# Patient Record
Sex: MALE | Race: WHITE | NOT HISPANIC OR LATINO | Employment: FULL TIME | URBAN - METROPOLITAN AREA
[De-identification: names, ages, dates, MRNs, and addresses within clinical notes are randomized per-mention and may not be internally consistent; named-entity substitution may affect disease eponyms.]

---

## 2017-02-24 ENCOUNTER — HOSPITAL ENCOUNTER (EMERGENCY)
Facility: CLINIC | Age: 37
Discharge: HOME | End: 2017-02-24
Attending: NURSE PRACTITIONER | Admitting: NURSE PRACTITIONER
Payer: COMMERCIAL

## 2017-02-24 VITALS
OXYGEN SATURATION: 97 % | DIASTOLIC BLOOD PRESSURE: 78 MMHG | SYSTOLIC BLOOD PRESSURE: 154 MMHG | RESPIRATION RATE: 14 BRPM | TEMPERATURE: 98 F

## 2017-02-24 DIAGNOSIS — S80.811A LEG ABRASION, RIGHT, INITIAL ENCOUNTER: ICD-10-CM

## 2017-02-24 DIAGNOSIS — L03.115 CELLULITIS OF RIGHT LOWER EXTREMITY: ICD-10-CM

## 2017-02-24 PROCEDURE — 99213 OFFICE O/P EST LOW 20 MIN: CPT | Performed by: NURSE PRACTITIONER

## 2017-02-24 PROCEDURE — 99213 OFFICE O/P EST LOW 20 MIN: CPT

## 2017-02-24 RX ORDER — CEPHALEXIN 500 MG/1
500 CAPSULE ORAL 4 TIMES DAILY
Qty: 28 CAPSULE | Refills: 0 | Status: SHIPPED | OUTPATIENT
Start: 2017-02-24 | End: 2017-03-03

## 2017-02-24 NOTE — DISCHARGE INSTRUCTIONS
Change dressing twice daily.  Ok to shower. Return for fever, increased redness, swelling, pain, or drainage from the wound.  Discharge Instructions for Cellulitis  You have been diagnosed with cellulitis. This is an infection in the deepest layer of the skin. In some cases, the infection also affects the muscle. Cellulitis is caused by bacteria. The bacteria can enter the body through broken skin. This can happen with a cut, scratch, animal bite, or an insect bite that has been scratched. You may have been treated in the hospital with antibiotics and fluids. You will likely be given a prescription for antibiotics to take at home. This sheet will help you take care of yourself at home.  Home Care  When you are home:    Take the prescribed antibiotic medication you are given as directed until it is gone. Take it even if you feel better. It treats the infection and stops it from returning. Not taking all of the medication can make future infections hard to treat.    Keep the infected area clean.    When possible, raise the infected area above the level of your heart. This helps keep swelling down.    Talk to your doctor if you are in pain. Ask what kind of over-the-counter medication you can take for pain.    Apply clean bandages as advised.    Take your temperature once a day for a week.    Wash your hands often to prevent spreading the infection.  In the future, wash your hands before and after you touch cuts, scratches, or bandages. This will help prevent infection.   When to Call Your Doctor  Call your doctor immediately if you have any of the following:    Vomiting    Fever of100.4 F (38 C) or higher, or as directed by your health care provider    Shaking chills    Redness that gets worse in or around the infected area    Swelling of the infected area    Pain that gets worse in or around the infected area    Difficulty or pain when moving the joints above or below the infected area    Discharge or pus draining  from the area     5189-2707 [employer]. [address] [place] [address], [place] [address]. All rights reserved. This information is not intended as a substitute for professional medical care. Always follow your healthcare professional's instructions.

## 2017-02-24 NOTE — ED PROVIDER NOTES
History     Chief Complaint   Patient presents with     Leg Injury     scraped right lower leg     HPI  Eduardo Casanova is a 36 year old male who presents to urgent care for evaluation of right lower leg/shin abrasion.  This occurred 3 days ago.  He tripped over a mower deck catching his right shin on the blade.  He applied a dressing and has been unable to remove the dressing since this occurred.  He noticed increased swelling and increased pain from the distal aspect of the abrasion.  Denies fever.     I have reviewed the Medications, Allergies, Past Medical and Surgical History, and Social History in the Epic system.    Review of Systems  As mentioned above in the history present illness. All other systems were reviewed and are negative.    Physical Exam   BP: 154/78  Heart Rate: 100  Temp: 98  F (36.7  C)  Resp: 14  SpO2: 97 %  Physical Exam  Appearance: NAD.  Skin: >10cm abrasion along the middle aspect of his right shin.  The distal aspect is indurated with surrounding faint erythema.  ED Course     ED Course     Procedures           Labs Ordered and Resulted from Time of ED Arrival Up to the Time of Departure from the ED - No data to display    Assessments & Plan (with Medical Decision Making)     I have reviewed the nursing notes.    I have reviewed the findings, diagnosis, plan and need for follow up with the patient.    Discharge Medication List as of 2/24/2017  1:44 PM      START taking these medications    Details   cephALEXin (KEFLEX) 500 MG capsule Take 1 capsule (500 mg) by mouth 4 times daily for 7 days, Disp-28 capsule, R-0, E-Prescribe             Final diagnoses:   Leg abrasion, right, initial encounter   Cellulitis of right lower extremity   -instructed to return for worsening symptoms.      2/24/2017   [facility] EMERGENCY DEPARTMENT     Mita Powell APRN CNP  02/24/17 [id number]

## 2017-02-24 NOTE — ED NOTES
Patient tripped over a machine in his garage three days ago resulting in a shin laceration. He put a dressing on it and he is having trouble getting the dressing off. Last evening his right ankle became swollen.

## 2017-02-24 NOTE — ED AVS SNAPSHOT
[facility] Emergency Department    [address]    [facility] [address]    Phone:  [phone]    Fax:  [phone]                                       Eduardo Casanova   MRN: [id number]    Department:  [facility] Emergency Department   Date of Visit:  2/24/2017           Patient Information     Date Of Birth          1980        Your diagnoses for this visit were:     Leg abrasion, right, initial encounter     Cellulitis of right lower extremity        You were seen by Mita Powell APRN CNP.      Follow-up Information     Follow up with [facility].    Why:  As needed    Contact information:    [phone]          Discharge Instructions       Change dressing twice daily.  Ok to shower. Return for fever, increased redness, swelling, pain, or drainage from the wound.  Discharge Instructions for Cellulitis  You have been diagnosed with cellulitis. This is an infection in the deepest layer of the skin. In some cases, the infection also affects the muscle. Cellulitis is caused by bacteria. The bacteria can enter the body through broken skin. This can happen with a cut, scratch, animal bite, or an insect bite that has been scratched. You may have been treated in the hospital with antibiotics and fluids. You will likely be given a prescription for antibiotics to take at home. This sheet will help you take care of yourself at home.  Home Care  When you are home:    Take the prescribed antibiotic medication you are given as directed until it is gone. Take it even if you feel better. It treats the infection and stops it from returning. Not taking all of the medication can make future infections hard to treat.    Keep the infected area clean.    When possible, raise the infected area above the level of your heart. This helps keep swelling down.    Talk to your doctor if you are in pain. Ask what kind of over-the-counter medication you can take for pain.    Apply clean bandages as  advised.    Take your temperature once a day for a week.    Wash your hands often to prevent spreading the infection.  In the future, wash your hands before and after you touch cuts, scratches, or bandages. This will help prevent infection.   When to Call Your Doctor  Call your doctor immediately if you have any of the following:    Vomiting    Fever of100.4 F (38 C) or higher, or as directed by your health care provider    Shaking chills    Redness that gets worse in or around the infected area    Swelling of the infected area    Pain that gets worse in or around the infected area    Difficulty or pain when moving the joints above or below the infected area    Discharge or pus draining from the area     2084-5103 [employer]. [address] [place] [address]. All rights reserved. This information is not intended as a substitute for professional medical care. Always follow your healthcare professional's instructions.          Future Appointments        Provider Department Dept Phone Center    4/17/2017 10:00 AM Jimy Esparza MD [facility] [phone] [place]      24 Hour Appointment Hotline       To make an appointment at any [facility], call [phone] ([phone]). If you don't have a family doctor or clinic, we will help you find one. [facility] are conveniently located to serve the needs of you and your family.             Review of your medicines      START taking        Dose / Directions Last dose taken    cephALEXin 500 MG capsule   Commonly known as:  KEFLEX   Dose:  500 mg   Quantity:  28 capsule        Take 1 capsule (500 mg) by mouth 4 times daily for 7 days   Refills:  0                Prescriptions were sent or printed at these locations (1 Prescription)                   [facility] [address] [place], [place] [address] [facility] AT [address]   [address] [facility] [address]    Telephone:  [phone]   Fax:   "[phone]   Hours:                  E-Prescribed (1 of 1)         cephALEXin (KEFLEX) 500 MG capsule                Orders Needing Specimen Collection     None      Pending Results     No orders found from 2017 to 2017.            Pending Culture Results     No orders found from 2017 to 2017.             Test Results from your hospital stay            Thank you for choosing [facility]       Thank you for choosing [facility] for your care. Our goal is always to provide you with excellent care. Hearing back from our patients is one way we can continue to improve our services. Please take a few minutes to complete the written survey that you may receive in the mail after you visit with us. Thank you!        Consumer Agent Portal (CAP)hart Information     [employer] lets you send messages to your doctor, view your test results, renew your prescriptions, schedule appointments and more. To sign up, go to [email or web address] . Click on \"Log in\" on the left side of the screen, which will take you to the Welcome page. Then click on \"Sign up Now\" on the right side of the page.     You will be asked to enter the access code listed below, as well as some personal information. Please follow the directions to create your username and password.     Your access code is: [email or web address]  Expires: 2017  1:44 PM     Your access code will  in 90 days. If you need help or a new code, please call your [facility] clinic or [phone].        Care EveryWhere ID     This is your Care EveryWhere ID. This could be used by other organizations to access your [facility] medical records  [id number]        After Visit Summary       This is your record. Keep this with you and show to your community pharmacist(s) and doctor(s) at your next visit.                  "

## 2017-02-24 NOTE — ED AVS SNAPSHOT
[facility] Emergency Department    [address] [facility] [address]    Phone:  [phone]    Fax:  [phone]                                       Eduardo Casanova   MRN: [id number]    Department:  [facility] Emergency Department   Date of Visit:  2/24/2017           After Visit Summary Signature Page     I have received my discharge instructions, and my questions have been answered. I have discussed any challenges I see with this plan with the nurse or doctor.    ..........................................................................................................................................  Patient/Patient Representative Signature      ..........................................................................................................................................  Patient Representative Print Name and Relationship to Patient    ..................................................               ................................................  Date                                            Time    ..........................................................................................................................................  Reviewed by Signature/Title    ...................................................              ..............................................  Date                                                            Time

## 2017-06-29 ENCOUNTER — OFFICE VISIT (OUTPATIENT)
Dept: FAMILY MEDICINE | Facility: CLINIC | Age: 37
End: 2017-06-29
Payer: COMMERCIAL

## 2017-06-29 VITALS — TEMPERATURE: 97.3 F | DIASTOLIC BLOOD PRESSURE: 73 MMHG | SYSTOLIC BLOOD PRESSURE: 119 MMHG

## 2017-06-29 DIAGNOSIS — Z23 NEED FOR VACCINATION: ICD-10-CM

## 2017-06-29 DIAGNOSIS — Z71.84 TRAVEL ADVICE ENCOUNTER: Primary | ICD-10-CM

## 2017-06-29 PROCEDURE — 90472 IMMUNIZATION ADMIN EACH ADD: CPT | Mod: GA | Performed by: NURSE PRACTITIONER

## 2017-06-29 PROCEDURE — 90632 HEPA VACCINE ADULT IM: CPT | Mod: GA | Performed by: NURSE PRACTITIONER

## 2017-06-29 PROCEDURE — 99402 PREV MED CNSL INDIV APPRX 30: CPT | Mod: 25 | Performed by: NURSE PRACTITIONER

## 2017-06-29 PROCEDURE — 90715 TDAP VACCINE 7 YRS/> IM: CPT | Mod: GA | Performed by: NURSE PRACTITIONER

## 2017-06-29 PROCEDURE — 90471 IMMUNIZATION ADMIN: CPT | Mod: GA | Performed by: NURSE PRACTITIONER

## 2017-06-29 RX ORDER — AZITHROMYCIN 500 MG/1
500 TABLET, FILM COATED ORAL DAILY
Qty: 3 TABLET | Refills: 0 | Status: SHIPPED | OUTPATIENT
Start: 2017-06-29 | End: 2017-11-10

## 2017-06-29 RX ORDER — ATOVAQUONE AND PROGUANIL HYDROCHLORIDE 250; 100 MG/1; MG/1
1 TABLET, FILM COATED ORAL DAILY
Qty: 20 TABLET | Refills: 0 | Status: SHIPPED | OUTPATIENT
Start: 2017-06-29 | End: 2018-05-14

## 2017-06-29 NOTE — MR AVS SNAPSHOT
After Visit Summary   6/29/2017    Eduardo Casanova    MRN: [id number]           Patient Information     Date Of Birth          1980        Visit Information        Provider Department      6/29/2017 12:45 PM Berta Ang APRN CNP [facility]        Today's Diagnoses     Travel advice encounter    -  1    Need for vaccination          Care Instructions    Today June 29, 2017 you received the    Hepatitis A Vaccine - Please return on 12/26/17 or later for your 2nd and final dose.    Tetanus (Tdap) Vaccine  .    These appointments can be made as a NURSE ONLY visit.    **It is very important for the vaccinations to be given on the scheduled day(s), this helps ensure you receive the full effectiveness of the vaccine.**    Please call [facility] with any questions [phone]    Thank you for visiting [facility]              Follow-ups after your visit        Future tests that were ordered for you today     Open Future Orders        Priority Expected Expires Ordered    HEPA VACCINE ADULT IM Routine  6/29/2018 6/29/2017            Who to contact     If you have questions or need follow up information about today's clinic visit or your schedule please contact [facility] directly at [phone].  Normal or non-critical lab and imaging results will be communicated to you by MyChart, letter or phone within 4 business days after the clinic has received the results. If you do not hear from us within 7 days, please contact the clinic through MyChart or phone. If you have a critical or abnormal lab result, we will notify you by phone as soon as possible.  Submit refill requests through [employer] or call your pharmacy and they will forward the refill request to us. Please allow 3 business days for your refill to be completed.          Additional Information About Your Visit        [employer] Information     [employer] lets you send messages to your  "doctor, view your test results, renew your prescriptions, schedule appointments and more. To sign up, go to [email or web address] . Click on \"Log in\" on the left side of the screen, which will take you to the Welcome page. Then click on \"Sign up Now\" on the right side of the page.     You will be asked to enter the access code listed below, as well as some personal information. Please follow the directions to create your username and password.     Your access code is: [email or web address]  Expires: 2017  1:34 PM     Your access code will  in 90 days. If you need help or a new code, please call your [facility] clinic or [phone].        Care EveryWhere ID     This is your Care EveryWhere ID. This could be used by other organizations to access your [facility] medical records  [id number]        Your Vitals Were     Temperature                   97.3  F (36.3  C) (Oral)            Blood Pressure from Last 3 Encounters:   17 119/73   17 154/78   16 118/64    Weight from Last 3 Encounters:   16 190 lb (86.2 kg)   02/15/16 192 lb 9.6 oz (87.4 kg)              We Performed the Following     HEPA VACCINE ADULT IM     TDAP VACCINE (ADACEL)          Today's Medication Changes          These changes are accurate as of: 17  1:34 PM.  If you have any questions, ask your nurse or doctor.               Start taking these medicines.        Dose/Directions    atovaquone-proguanil 250-100 MG per tablet   Commonly known as:  MALARONE   Used for:  Travel advice encounter   Started by:  Berta Ang APRN CNP        Dose:  1 tablet   Take 1 tablet by mouth daily Start 2 days before exposure to Malaria and continue daily till  7 days after exposure.   Quantity:  20 tablet   Refills:  0       azithromycin 500 MG tablet   Commonly known as:  ZITHROMAX   Used for:  Travel advice encounter   Started by:  Berta Ang APRN CNP        Dose:  500 mg   Take 1 tablet (500 mg) by mouth daily for 3 " doses Take 1 tablet a day for up to 3 days for severe diarrhea   Quantity:  3 tablet   Refills:  0            Where to get your medicines      These medications were sent to [id number] - [facility] [address] [facility] AT [facility] OF [address] [place] [address]  [address] [facility] [address]     Phone:  [phone]     atovaquone-proguanil 250-100 MG per tablet    azithromycin 500 MG tablet                Primary Care Provider Office Phone #    [facility] [phone]       No address on file        Equal Access to Services     [facility] : Hadii ranjit brewer hadasho Soomaali, waaxda luqadaha, qaybta kaalmada adeegyada, walter miranda . So [facility] [phone].    ATENCIÓN: Si habla español, tiene a villareal disposición servicios gratuitos de asistencia lingüística. [facility] [phone].    We comply with applicable federal civil rights laws and [place] laws. We do not discriminate on the basis of race, color, national origin, age, disability sex, sexual orientation or gender identity.            Thank you!     Thank you for choosing [facility]  for your care. Our goal is always to provide you with excellent care. Hearing back from our patients is one way we can continue to improve our services. Please take a few minutes to complete the written survey that you may receive in the mail after your visit with us. Thank you!             Your Updated Medication List - Protect others around you: Learn how to safely use, store and throw away your medicines at www.disposemymeds.org.          This list is accurate as of: 6/29/17  1:34 PM.  Always use your most recent med list.                   Brand Name Dispense Instructions for use Diagnosis    atovaquone-proguanil 250-100 MG per tablet    MALARONE    20 tablet    Take 1 tablet by mouth daily Start 2 days before exposure to Malaria and continue daily till  7 days after exposure.    Travel advice encounter        azithromycin 500 MG tablet    ZITHROMAX    3 tablet    Take 1 tablet (500 mg) by mouth daily for 3 doses Take 1 tablet a day for up to 3 days for severe diarrhea    Travel advice encounter

## 2017-06-29 NOTE — PATIENT INSTRUCTIONS
Today June 29, 2017 you received the    Hepatitis A Vaccine - Please return on 12/26/17 or later for your 2nd and final dose.    Tetanus (Tdap) Vaccine  .    These appointments can be made as a NURSE ONLY visit.    **It is very important for the vaccinations to be given on the scheduled day(s), this helps ensure you receive the full effectiveness of the vaccine.**    Please call [facility] with any questions [phone]    Thank you for visiting [facility]

## 2017-06-29 NOTE — PROGRESS NOTES
Nurse Note      Itinerary:  [place]       Departure Date: 07/22/2017      Return Date: 07/30/2017      Length of Trip 9 days      Reason for Travel: [employer] work           Urban or rural: both      Accommodations: Dormitory         IMMUNIZATION HISTORY  Have you received any immunizations within the past 4 weeks?  No  Have you ever fainted from having your blood drawn or from an injection?  No  Have you ever had a fever reaction to vaccination?  No  Have you ever had any bad reaction or side effect from any vaccination?  No  Have you ever had hepatitis A or B vaccine?  No  Do you live (or work closely) with anyone who has AIDS, an AIDS-like condition, any other immune disorder or who is on chemotherapy for cancer?  No  Do you have a family history of immunodeficiency?  No  Have you received any injection of immune globulin or any blood products during the past 12 months?  No    Patient roomed by MAURICE Washburn  Eduardo Casanova is a 36 year old male seen today alone for counsultation for international travel to [place] for [employer] work.  Patient will be departing in  3 week(s) and staying for   8 day(s) and  traveling with [employer].      Patient itinerary :  will be in the [facility] which presents risk for Malaria, Dengue Fever, Chikungungya, Zika, Schistosomiasis, Rabies, food borne illnesses, motor vehicle accidents, Typhoid and Chagas disease. exposure.      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    Patient's country of birth is [place]    Special medical concerns: none  Pre-travel questionnaire was completed by patient and reviewed by provider.     Vitals: /73  Temp 97.3  F (36.3  C) (Oral)  BMI= There is no height or weight on file to calculate BMI.    EXAM:  General:  Well-nourished, well-developed in no acute distress.  Appears to be stated age, interacts appropriately and expresses understanding of information given to  patient.    Current Outpatient Prescriptions   Medication Sig Dispense Refill     atovaquone-proguanil (MALARONE) 250-100 MG per tablet Take 1 tablet by mouth daily Start 2 days before exposure to Malaria and continue daily till  7 days after exposure. 20 tablet 0     There is no problem list on file for this patient.    No Known Allergies      Immunizations discussed include:   Hepatitis A:  Ordered/given today, risks, benefits and side effects reviewed  Hepatitis B: Declined  Not concerned about risk of disease  May have had vaccine, will check   Influenza: Declined  Not concerned about risk of disease  Typhoid: Declined  Not concerned about risk of disease  Rabies: Insufficient time to vaccinate  Yellow Fever: Not indicated  Japanese Encephalitis: Not indicated  Meningococcus: Not indicated  Tetanus/Diphtheria: Ordered/given today, risks, benefits and side effects reviewed  Measles/Mumps/Rubella: Up to date  Cholera: Not needed  Polio: Up to date  Pneumococcal: Under age of 65  Varicella: Immune by disease history per patient report  Zostavax:  Not indicated  HPV:  Not indicated  TB:  Low risk    Altitude Exposure on this trip: no    ASSESSMENT/PLAN:    ICD-10-CM    1. Travel advice encounter Z71.89 HEPA VACCINE ADULT IM     TDAP VACCINE (ADACEL)     atovaquone-proguanil (MALARONE) 250-100 MG per tablet     azithromycin (ZITHROMAX) 500 MG tablet   2. Need for vaccination Z23 HEPA VACCINE ADULT IM     HEPA VACCINE ADULT IM     TDAP VACCINE (ADACEL)     I have reviewed general recommendations for safe travel   including: food/water precautions, insect precautions, safer sex   practices given high prevalence of Zika, HIV and other STDs,   roadway safety. Educational materials and [employer] report provided.    Malaraia prophylaxis recommended: Malarone  Symptomatic treatment for traveler's diarrhea: azithromycin  Altitude illness prevention and treatment: no      Evacuation insurance advised and resources were provided  to patient.    Total visit time 30 minutes  with over 50% of time spent counseling patient as detailed above.    Berta Ang CNP

## 2017-11-10 ENCOUNTER — TELEPHONE (OUTPATIENT)
Dept: FAMILY MEDICINE | Facility: CLINIC | Age: 37
End: 2017-11-10

## 2017-11-10 DIAGNOSIS — Z71.84 TRAVEL ADVICE ENCOUNTER: ICD-10-CM

## 2017-11-10 NOTE — TELEPHONE ENCOUNTER
"LH,  Patient traveling to [place] again  Was there in July 2017  Saw you for travel visit 6/29/2017  Patient will call closer [place] to his home to get second Hep A vaccine.  Requesting refill of Azithromycin for traveler's diarrhea.  Asked if he needed/wanted the Malarone again as well - declined stating \"I didn't see a single mosquito the last time I was there.\"   Also asked about parasites - last time he went, the people he went with got parasites - educated him on food and water safety while traveling.  Order for Azithromycin pended if you approve  Thanks,  Nati LUEVANO RN    "

## 2017-11-10 NOTE — TELEPHONE ENCOUNTER
Reason for Call:  Other prescription    Detailed comments: traveling to [facility] again an pt is wondering about becoming ill with parasite. Do you give him a script for this Dec 26th is travel date    Phone Number Patient can be reached at: Home number on file [phone] (home)    Best Time: any    Can we leave a detailed message on this number? YES    Call taken on 11/10/2017 at 4:28 PM by Jesica Balderas

## 2017-11-11 RX ORDER — AZITHROMYCIN 500 MG/1
500 TABLET, FILM COATED ORAL DAILY
Qty: 3 TABLET | Refills: 0 | Status: SHIPPED | OUTPATIENT
Start: 2017-11-11 | End: 2018-05-14

## 2017-11-11 NOTE — TELEPHONE ENCOUNTER
I signed Rx for Zithromax for Travelers Diarrhea.   If he has symptoms of parasites post travel, should come in. I agree to reinforce food and water precautions. No fresh vegetables.  Please inform  Berta Ang CNP (lori)

## 2018-03-02 ENCOUNTER — OFFICE VISIT (OUTPATIENT)
Dept: FAMILY MEDICINE | Facility: CLINIC | Age: 38
End: 2018-03-02
Payer: COMMERCIAL

## 2018-03-02 VITALS
SYSTOLIC BLOOD PRESSURE: 123 MMHG | DIASTOLIC BLOOD PRESSURE: 79 MMHG | BODY MASS INDEX: 21.94 KG/M2 | HEIGHT: 78 IN | WEIGHT: 189.6 LBS | TEMPERATURE: 97 F | HEART RATE: 60 BPM

## 2018-03-02 DIAGNOSIS — G43.109 MIGRAINE WITH AURA AND WITHOUT STATUS MIGRAINOSUS, NOT INTRACTABLE: Primary | ICD-10-CM

## 2018-03-02 PROCEDURE — 99214 OFFICE O/P EST MOD 30 MIN: CPT | Performed by: FAMILY MEDICINE

## 2018-03-02 RX ORDER — VENLAFAXINE HYDROCHLORIDE 37.5 MG/1
CAPSULE, EXTENDED RELEASE ORAL
Qty: 120 CAPSULE | Refills: 1 | Status: SHIPPED | OUTPATIENT
Start: 2018-03-02 | End: 2018-05-14

## 2018-03-02 RX ORDER — RIZATRIPTAN BENZOATE 10 MG/1
10 TABLET, ORALLY DISINTEGRATING ORAL
Qty: 6 TABLET | Refills: 1 | Status: SHIPPED | OUTPATIENT
Start: 2018-03-02 | End: 2019-07-01

## 2018-03-02 NOTE — MR AVS SNAPSHOT
"              After Visit Summary   3/2/2018    Eduardo Casanova    MRN: [id number]           Patient Information     Date Of Birth          1980        Visit Information        Provider Department      3/2/2018 3:00 PM Kristina Ng MD [facility]        Today's Diagnoses     Migraine with aura and without status migrainosus, not intractable    -  1       Follow-ups after your visit        Future tests that were ordered for you today     Open Future Orders        Priority Expected Expires Ordered    MR Brain w/o & w Contrast Routine  3/2/2019 3/2/2018    MRA Head w/o Contrast Angiogram Routine  3/2/2019 3/2/2018            Who to contact     Normal or non-critical lab and imaging results will be communicated to you by iSoccerhart, letter or phone within 4 business days after the clinic has received the results. If you do not hear from us within 7 days, please contact the clinic through MyChart or phone. If you have a critical or abnormal lab result, we will notify you by phone as soon as possible.  Submit refill requests through [employer] or call your pharmacy and they will forward the refill request to us. Please allow 3 business days for your refill to be completed.          If you need to speak with a  for additional information , please call: [phone]             Additional Information About Your Visit        [employer] Information     [employer] lets you send messages to your doctor, view your test results, renew your prescriptions, schedule appointments and more. To sign up, go to [email or web address] . Click on \"Log in\" on the left side of the screen, which will take you to the Welcome page. Then click on \"Sign up Now\" on the right side of the page.     You will be asked to enter the access code listed below, as well as some personal information. Please follow the directions to create your username and password.     Your access code is: [email or web address]  Expires: 5/31/2018  3:45 " "PM     Your access code will  in 90 days. If you need help or a new code, please call your [place] clinic or [phone].        Care EveryWhere ID     This is your Care EveryWhere ID. This could be used by other organizations to access your [place] medical records  [id number]        Your Vitals Were     Pulse Temperature Height BMI (Body Mass Index)          60 97  F (36.1  C) 6' 7.5\" (2.019 m) 21.09 kg/m2         Blood Pressure from Last 3 Encounters:   18 123/79   17 119/73   17 154/78    Weight from Last 3 Encounters:   18 189 lb 9.6 oz (86 kg)   16 190 lb (86.2 kg)   02/15/16 192 lb 9.6 oz (87.4 kg)                 Today's Medication Changes          These changes are accurate as of 3/2/18  3:45 PM.  If you have any questions, ask your nurse or doctor.               Start taking these medicines.        Dose/Directions    rizatriptan 10 MG ODT tab   Commonly known as:  MAXALT-MLT   Used for:  Migraine with aura and without status migrainosus, not intractable   Started by:  Kristina Ng MD        Dose:  10 mg   Take 1 tablet (10 mg) by mouth at onset of headache for migraine May repeat in 2 hours. Max 3 tablets/24 hours.   Quantity:  6 tablet   Refills:  1       venlafaxine 37.5 MG 24 hr capsule   Commonly known as:  EFFEXOR-XR   Used for:  Migraine with aura and without status migrainosus, not intractable   Started by:  Kristina Ng MD        Take 1 capsule daily for 3-5 days, then increase to two capsules /day for 3-5 days, then increase to three capsules/day for 3-5days, then increase to four capsules/day.   Quantity:  120 capsule   Refills:  1            Where to get your medicines      These medications were sent to [facility] [address] [place] [address] [address] AT [facility] OF [address] [place] [address]  [address] [facility] [address]     Phone:  [phone]     rizatriptan 10 MG ODT tab         Some of these will need a paper " prescription and others can be bought over the counter.  Ask your nurse if you have questions.     Bring a paper prescription for each of these medications     venlafaxine 37.5 MG 24 hr capsule                Primary Care Provider Office Phone # Fax #    [facility] [phone] [address] [facility] [address]        Equal Access to Services     ALEXANDRE ZAMAN : Hadii aad ku hadasho Soomaali, waaxda luqadaha, qaybta kaalmada adeegyada, waxay marciain hayaan ademik munizjosejuan miranda . So [facility] [phone].    ATENCIÓN: Si habla español, tiene a villareal disposición servicios gratuitos de asistencia lingüística. Llame al [phone].    We comply with applicable federal civil rights laws and [place] laws. We do not discriminate on the basis of race, color, national origin, age, disability, sex, sexual orientation, or gender identity.            Thank you!     Thank you for choosing [facility]  for your care. Our goal is always to provide you with excellent care. Hearing back from our patients is one way we can continue to improve our services. Please take a few minutes to complete the written survey that you may receive in the mail after your visit with us. Thank you!             Your Updated Medication List - Protect others around you: Learn how to safely use, store and throw away your medicines at www.disposemymeds.org.          This list is accurate as of 3/2/18  3:45 PM.  Always use your most recent med list.                   Brand Name Dispense Instructions for use Diagnosis    atovaquone-proguanil 250-100 MG per tablet    MALARONE    20 tablet    Take 1 tablet by mouth daily Start 2 days before exposure to Malaria and continue daily till  7 days after exposure.    Travel advice encounter       azithromycin 500 MG tablet    ZITHROMAX    3 tablet    Take 1 tablet (500 mg) by mouth daily Take 1 tablet a day for up to 3 days for severe diarrhea    Travel advice encounter        rizatriptan 10 MG ODT tab    MAXALT-MLT    6 tablet    Take 1 tablet (10 mg) by mouth at onset of headache for migraine May repeat in 2 hours. Max 3 tablets/24 hours.    Migraine with aura and without status migrainosus, not intractable       venlafaxine 37.5 MG 24 hr capsule    EFFEXOR-XR    120 capsule    Take 1 capsule daily for 3-5 days, then increase to two capsules /day for 3-5 days, then increase to three capsules/day for 3-5days, then increase to four capsules/day.    Migraine with aura and without status migrainosus, not intractable

## 2018-03-02 NOTE — PROGRESS NOTES
SUBJECTIVE:   Eduardo Casanova is a 37 year old male who presents to clinic today for the following health issues:      Headache  Onset: Patient said he keeps getting different migraine episodes     Description:   Location: bilateral in the frontal area   Character: constant pressure- squeezing   Frequency:  3 this past week   Duration:  Last about the whole day or half a day     Intensity: moderate- depends though     Progression of Symptoms:  More frequent     Accompanying Signs & Symptoms:  Stiff neck: YES- usual for him   Neck or upper back pain: no  Fever: no  Sinus pressure: no  Nausea or vomiting: YES  Dizziness: no  Numbness: no  Weakness: no  Visual changes: YES-  When headache comes on     History:   Head trauma: no  Family history of migraines: no  Previous tests for headaches: no  Neurologist evaluations: no  Able to do daily activities: no  Wake with a headaches: no  Do headaches wake you up: no  Daily pain medication use: no  Work/school stressors/changes: no    Precipitating factors:   Does light make it worse: YES  Does sound make it worse: YES-  Sometimes     Alleviating factors:  Does sleep help: YES- depends     Therapies Tried and outcome: Ibuprofen (Advil, Motrin)    Migraines diagnosed in late teenage years  - very extreme pain, n/v, light/sound sensitive - probably happening every 2-3 months. Unknown triggers.   Improved by 2007  Was never on prophylatic medication   Tried midrin - that helped   imitrex - didn't help     Last fall - 10/2017 - got a migraine and another one a week later  Then a few months with none  But now are coming every week and now he is getting three this week.     - + aura, n/v, light/sound sensitivity    Tried over the counter meds but he vomits them up    No new stressors in life  No changes in routine  No patterns in food/sleep     Family history: none     ROS -   No fever/chills  No weight change  Normal appetite and energy level  No malaise or fatigue  No runny  "nose, sore throat, facial congestion, sinus pain, ear pain  No trouble chewing, talking, swallowing  Occasional neck stiffness   No SOB, wheezing, cough  No chest pain or pressure  No dysphagia, nausea, vomiting  No diarrhea, constipation, melena, or hematochezia  No dysuria, urgency, frequency  No arthalgias or joint swelling   No myalgias  No easy bleeding or bruising   No neuro sxs  No rashes  No depression/anxiety       Problem list and histories reviewed & adjusted, as indicated.  Additional history: as documented    There is no problem list on file for this patient.    Current Outpatient Prescriptions   Medication     azithromycin (ZITHROMAX) 500 MG tablet     atovaquone-proguanil (MALARONE) 250-100 MG per tablet     No current facility-administered medications for this visit.      /79 (BP Location: Right arm, Patient Position: Sitting, Cuff Size: Adult Large)  Pulse 60  Temp 97  F (36.1  C)  Ht 6' 7.5\" (2.019 m)  Wt 189 lb 9.6 oz (86 kg)  BMI 21.09 kg/m2  GENERAL - Pt is alert and oriented in no acute distress.  Affect is appropriate. Good eye contact.  HEET - Head is normocephalic, atraumatic.    PERRLA,EEMI. Conjunctiva are free of icterus or erythema.    TMs bilaterally normal.   Oropharynx free of masses and lesions, no tonsillar exudate or petechiae.    NECK - Neck is supple w/o LA or thyromegaly  RESPIRATORY - Clear to auscultation bilaterally.  No wheezing noted  CV - RRR, no murmurs, rubs, gallops.   Neuro- Reflexes 2+ and equal. Sensation intact. CN II-XII intact. Rapidly alt. movements and finger-nose-finger intact. Negative Romberg.  EXTREM - No edema.        Assessment/Plan -    (G43.109) Migraine with aura and without status migrainosus, not intractable  (primary encounter diagnosis)  Comment: discussed diagnosis and treatment options. Given worsening intensity, we will do mri/mra. Discussed trial of prophylactic med since he is getting them so often. I also gave him some maxalt to " try. Return to clinic with headache journal in a month, or sooner prn. The patient indicates understanding of these issues and agrees with the plan.   Plan: venlafaxine (EFFEXOR-XR) 37.5 MG 24 hr capsule,        rizatriptan (MAXALT-MLT) 10 MG ODT tab, MR         Brain w/o & w Contrast, MRA Head w/o Contrast         Angiogram          MD WARD Green MD

## 2018-03-02 NOTE — NURSING NOTE
"Chief Complaint   Patient presents with     Headache       Initial /79 (BP Location: Right arm, Patient Position: Sitting, Cuff Size: Adult Large)  Pulse 60  Temp 97  F (36.1  C)  Ht 6' 7.5\" (2.019 m)  Wt 189 lb 9.6 oz (86 kg)  BMI 21.09 kg/m2 Estimated body mass index is 21.09 kg/(m^2) as calculated from the following:    Height as of this encounter: 6' 7.5\" (2.019 m).    Weight as of this encounter: 189 lb 9.6 oz (86 kg).  Medication Reconciliation: complete   Gracie Martinez LPN    "

## 2018-03-13 ENCOUNTER — TELEPHONE (OUTPATIENT)
Dept: FAMILY MEDICINE | Facility: CLINIC | Age: 38
End: 2018-03-13

## 2018-03-13 NOTE — TELEPHONE ENCOUNTER
"Pt notified of recommendation of contrast for MRI.    Pt took rizatriptan yesterday when he had a visual aura. Only took one dose, did not repeat.    It \"reduced the pain of headache,\" however the \"visual aura, returned,\" patient because nausea and vomiting still took place approximately 2-2 1/2 hours after taking dose.     Did review dose can be repeated in two hours, NTE 3 tabs/24 hours.    Pt still wondering if there is something else he can take?    Advise.    Deepika VILLA RN            "

## 2018-03-13 NOTE — TELEPHONE ENCOUNTER
I would have him repeat the dose next time if symptoms return - if he finds it is the 2nd dose that fully alleviates his symptoms then the next time he has a headache I would start with the 2 tabs right away  Jaqui

## 2018-03-13 NOTE — TELEPHONE ENCOUNTER
Reason for Call:  Other appointment    Detailed comments: Eduardo has orders to have an MRA head without contrast  and MR brain with and without contrast.  He states that he can get it done at [facility], as its a lot less expensive, but they don't use contrast, wondering how important it is that he have it done with contrast?  Please review and advise. Thank you..Anat Martel    Phone Number Patient can be reached at: Home number on file [phone] (home)    Best Time: any time    Can we leave a detailed message on this number? YES    Call taken on 3/13/2018 at 10:23 AM by Anat Martel

## 2018-03-13 NOTE — TELEPHONE ENCOUNTER
The contrast is needed to show the anatomy of the brain better. When imaging the brain for migraine headache change, contrast is recommended. Pushpa Andres M.D.;

## 2018-03-27 ENCOUNTER — TRANSFERRED RECORDS (OUTPATIENT)
Dept: HEALTH INFORMATION MANAGEMENT | Facility: CLINIC | Age: 38
End: 2018-03-27

## 2018-04-03 ENCOUNTER — TELEPHONE (OUTPATIENT)
Dept: FAMILY MEDICINE | Facility: CLINIC | Age: 38
End: 2018-04-03

## 2018-04-03 NOTE — TELEPHONE ENCOUNTER
Reason for Call:  Other results from MRA/MRI    Detailed comments: Eduardo LEFT MESSAGE:  He had his MRI/MRA done on 3/27/18 and wondering what the results were.  Results placed on your desk.  Please review and advise. Thank you..Anat Martel    Phone Number Patient can be reached at: Home number on file [phone] (home)    Call taken on 4/3/2018 at 11:39 AM by Anat Martel

## 2018-04-03 NOTE — TELEPHONE ENCOUNTER
Please call him - his mri/mra was read as normal - no masses or aneurysms or bleeding or strokes.  Everything looked good. If symptoms are persisting/worsening, return to clinic        WARD Ng MD

## 2018-05-07 ENCOUNTER — TELEPHONE (OUTPATIENT)
Dept: FAMILY MEDICINE | Facility: CLINIC | Age: 38
End: 2018-05-07

## 2018-05-07 NOTE — TELEPHONE ENCOUNTER
Given to Dr. Ng. Please fax to destination on form when complete.    Ave Manrique   [facility] [place]

## 2018-05-08 NOTE — TELEPHONE ENCOUNTER
I saw him once for headaches. I cannot complete a physical exam/mental wellness and maturity form.  He will need to make an appointment     WARD Ng MD

## 2018-05-14 ENCOUNTER — OFFICE VISIT (OUTPATIENT)
Dept: FAMILY MEDICINE | Facility: CLINIC | Age: 38
End: 2018-05-14
Payer: COMMERCIAL

## 2018-05-14 VITALS
HEART RATE: 67 BPM | DIASTOLIC BLOOD PRESSURE: 74 MMHG | HEIGHT: 78 IN | BODY MASS INDEX: 21.69 KG/M2 | SYSTOLIC BLOOD PRESSURE: 124 MMHG | WEIGHT: 187.5 LBS | TEMPERATURE: 97.4 F

## 2018-05-14 DIAGNOSIS — Z00.00 ROUTINE GENERAL MEDICAL EXAMINATION AT A HEALTH CARE FACILITY: Primary | ICD-10-CM

## 2018-05-14 PROCEDURE — 99395 PREV VISIT EST AGE 18-39: CPT | Performed by: FAMILY MEDICINE

## 2018-05-14 NOTE — MR AVS SNAPSHOT
After Visit Summary   5/14/2018    Eduardo Casanova    MRN: [id number]           Patient Information     Date Of Birth          1980        Visit Information        Provider Department      5/14/2018 10:00 AM Jimy Esparza MD [facility]        Today's Diagnoses     Routine general medical examination at a health care facility    -  1      Care Instructions      Preventive Health Recommendations  Male Ages 26 - 39    Yearly exam:             See your health care provider every year in order to  o   Review health changes.   o   Discuss preventive care.    o   Review your medicines if your doctor has prescribed any.    You should be tested each year for STDs (sexually transmitted diseases), if you re at risk.     After age 35, talk to your provider about cholesterol testing. If you are at risk for heart disease, have your cholesterol tested at least every 5 years.     If you are at risk for diabetes, you should have a diabetes test (fasting glucose).  Shots: Get a flu shot each year. Get a tetanus shot every 10 years.     Nutrition:    Eat at least 5 servings of fruits and vegetables daily.     Eat whole-grain bread, whole-wheat pasta and brown rice instead of white grains and rice.     Talk to your provider about Calcium and Vitamin D.     Lifestyle    Exercise for at least 150 minutes a week (30 minutes a day, 5 days a week). This will help you control your weight and prevent disease.     Limit alcohol to one drink per day.     No smoking.     Wear sunscreen to prevent skin cancer.     See your dentist every six months for an exam and cleaning.             Follow-ups after your visit        Your next 10 appointments already scheduled     Jun 12, 2018 10:00 AM CDT   Office Visit with Amanda Robin DO   [facility]    [address]  [address]  [facility] [address] [phone]           Bring a current list of meds and any records  "pertaining to this visit. For Physicals, please bring immunization records and any forms needing to be filled out. Please arrive 10 minutes early to complete paperwork.              Who to contact     Normal or non-critical lab and imaging results will be communicated to you by 7Summitshart, letter or phone within 4 business days after the clinic has received the results. If you do not hear from us within 7 days, please contact the clinic through 7Summitshart or phone. If you have a critical or abnormal lab result, we will notify you by phone as soon as possible.  Submit refill requests through [employer] or call your pharmacy and they will forward the refill request to us. Please allow 3 business days for your refill to be completed.          If you need to speak with a  for additional information , please call: [phone]             Additional Information About Your Visit        [employer] Information     [employer] lets you send messages to your doctor, view your test results, renew your prescriptions, schedule appointments and more. To sign up, go to [email or web address] . Click on \"Log in\" on the left side of the screen, which will take you to the Welcome page. Then click on \"Sign up Now\" on the right side of the page.     You will be asked to enter the access code listed below, as well as some personal information. Please follow the directions to create your username and password.     Your access code is: [email or web address]  Expires: 2018  4:45 PM     Your access code will  in 90 days. If you need help or a new code, please call your [place] clinic or [phone].        Care EveryWhere ID     This is your Care EveryWhere ID. This could be used by other organizations to access your [place] medical records  [id number]        Your Vitals Were     Pulse Temperature Height BMI (Body Mass Index)          67 97.4  F (36.3  C) (Tympanic) 6' 8\" (2.032 m) 20.6 kg/m2         Blood Pressure from Last 3 " Encounters:   05/14/18 124/74   03/02/18 123/79   06/29/17 119/73    Weight from Last 3 Encounters:   05/14/18 187 lb 8 oz (85 kg)   03/02/18 189 lb 9.6 oz (86 kg)   07/18/16 190 lb (86.2 kg)              Today, you had the following     No orders found for display       Primary Care Provider Office Phone # Fax #    [facility] [phone] [address] [address]        Equal Access to Services     [facility] : Hadii aad ku hadasho Soomaali, waaxda luqadaha, qaybta kaalmada adeegyada, walter miranda . So [facility] [phone].    ATENCIÓN: Si habla español, tiene a villareal disposición servicios gratuitos de asistencia lingüística. Llame al [phone].    We comply with applicable federal civil rights laws and [place] laws. We do not discriminate on the basis of race, color, national origin, age, disability, sex, sexual orientation, or gender identity.            Thank you!     Thank you for choosing [facility]  for your care. Our goal is always to provide you with excellent care. Hearing back from our patients is one way we can continue to improve our services. Please take a few minutes to complete the written survey that you may receive in the mail after your visit with us. Thank you!             Your Updated Medication List - Protect others around you: Learn how to safely use, store and throw away your medicines at www.disposemymeds.org.          This list is accurate as of 5/14/18 11:09 AM.  Always use your most recent med list.                   Brand Name Dispense Instructions for use Diagnosis    rizatriptan 10 MG ODT tab    MAXALT-MLT    6 tablet    Take 1 tablet (10 mg) by mouth at onset of headache for migraine May repeat in 2 hours. Max 3 tablets/24 hours.    Migraine with aura and without status migrainosus, not intractable

## 2018-05-14 NOTE — PROGRESS NOTES
SUBJECTIVE:   CC: Eduardo Casanova is an 37 year old male who presents for preventative health visit.     Healthy Habits:    Do you get at least three servings of calcium containing foods daily (dairy, green leafy vegetables, etc.)? yes    Amount of exercise or daily activities, outside of work: 2 day(s) per week    Problems taking medications regularly No    Medication side effects: No    Have you had an eye exam in the past two years? yes    Do you see a dentist twice per year? yes    Do you have sleep apnea, excessive snoring or daytime drowsiness?no    *Health forms for child adoption.      Today's PHQ-2 Score:   PHQ-2 ( 1999 Pf[employer]) 5/14/2018 2/15/2016   Q1: Little interest or pleasure in doing things 0 0   Q2: Feeling down, depressed or hopeless 0 0   PHQ-2 Score 0 0     Abuse: Current or Past(Physical, Sexual or Emotional)- No  Do you feel safe in your environment - Yes    Social History   Substance Use Topics     Smoking status: Never Smoker     Smokeless tobacco: Never Used     Alcohol use Not on file      Comment: Occasionally      If you drink alcohol do you typically have >3 drinks per day or >7 drinks per week? No                      Last PSA: No results found for: PSA    Reviewed orders with patient. Reviewed health maintenance and updated orders accordingly - Yes    Reviewed and updated as needed this visit by clinical staff  Tobacco  Allergies  Meds  Med Hx  Surg Hx  Fam Hx  Soc Hx      Reviewed and updated as needed this visit by Provider           ROS:  C: NEGATIVE for fever, chills, change in weight  I: NEGATIVE for worrisome rashes, moles or lesions  E: NEGATIVE for vision changes or irritation  ENT: NEGATIVE for ear, mouth and throat problems  R: NEGATIVE for significant cough or SOB  CV: NEGATIVE for chest pain, palpitations or peripheral edema  GI: NEGATIVE for nausea, abdominal pain, heartburn, or change in bowel habits   male: negative for dysuria, hematuria, decreased urinary  "stream, erectile dysfunction, urethral discharge  M: NEGATIVE for significant arthralgias or myalgia  N: NEGATIVE for weakness, dizziness or paresthesias  P: NEGATIVE for changes in mood or affect    OBJECTIVE:   /74  Pulse 67  Temp 97.4  F (36.3  C) (Tympanic)  Ht 6' 8\" (2.032 m)  Wt 187 lb 8 oz (85 kg)  BMI 20.6 kg/m2     EXAM:  GENERAL: healthy, alert and no distress  NECK: no adenopathy, no asymmetry, masses, or scars and thyroid normal to palpation  RESP: lungs clear to auscultation - no rales, rhonchi or wheezes  CV: regular rate and rhythm, normal S1 S2, no S3 or S4, no murmur, click or rub, no peripheral edema and peripheral pulses strong  ABDOMEN: soft, nontender, no hepatosplenomegaly, no masses and bowel sounds normal  MS: no gross musculoskeletal defects noted, no edema    ASSESSMENT/PLAN:   1. Routine general medical examination at a health care facility    COUNSELING:  Reviewed preventive health counseling, as reflected in patient instructions       Regular exercise       Healthy diet/nutrition       Vision screening       Hearing screening       Colon cancer screening       Prostate cancer screening    (Z00.00) Routine general medical examination at a health care facility  (primary encounter diagnosis)  Patient Instructions     Preventive Health Recommendations  Male Ages 26 - 39    Yearly exam:             See your health care provider every year in order to  o   Review health changes.   o   Discuss preventive care.    o   Review your medicines if your doctor has prescribed any.    You should be tested each year for STDs (sexually transmitted diseases), if you re at risk.     After age 35, talk to your provider about cholesterol testing. If you are at risk for heart disease, have your cholesterol tested at least every 5 years.     If you are at risk for diabetes, you should have a diabetes test (fasting glucose).  Shots: Get a flu shot each year. Get a tetanus shot every 10 years. " "    Nutrition:    Eat at least 5 servings of fruits and vegetables daily.     Eat whole-grain bread, whole-wheat pasta and brown rice instead of white grains and rice.     Talk to your provider about Calcium and Vitamin D.     Lifestyle    Exercise for at least 150 minutes a week (30 minutes a day, 5 days a week). This will help you control your weight and prevent disease.     Limit alcohol to one drink per day.     No smoking.     Wear sunscreen to prevent skin cancer.     See your dentist every six months for an exam and cleaning.                reports that he has never smoked. He has never used smokeless tobacco.    Estimated body mass index is 20.6 kg/(m^2) as calculated from the following:    Height as of this encounter: 6' 8\" (2.032 m).    Weight as of this encounter: 187 lb 8 oz (85 kg).       Counseling Resources:  ATP IV Guidelines  Pooled Cohorts Equation Calculator  FRAX Risk Assessment  ICSI Preventive Guidelines  Dietary Guidelines for Americans, 2010  USDA's MyPlate  ASA Prophylaxis  Lung CA Screening    Jimy Esparza MD  [facility]  "

## 2018-06-12 ENCOUNTER — ALLIED HEALTH/NURSE VISIT (OUTPATIENT)
Dept: NURSING | Facility: CLINIC | Age: 38
End: 2018-06-12
Payer: COMMERCIAL

## 2018-06-12 DIAGNOSIS — Z23 NEED FOR VACCINATION: ICD-10-CM

## 2018-06-12 DIAGNOSIS — Z23 NEED FOR HEPATITIS A IMMUNIZATION: Primary | ICD-10-CM

## 2018-06-12 PROCEDURE — 90471 IMMUNIZATION ADMIN: CPT | Mod: GA

## 2018-06-12 PROCEDURE — 90632 HEPA VACCINE ADULT IM: CPT | Mod: GA

## 2018-06-12 NOTE — MR AVS SNAPSHOT
"              After Visit Summary   2018    Eduardo Casanova    MRN: [id number]           Patient Information     Date Of Birth          1980        Visit Information        Provider Department      2018 10:00 AM UP NURSE [facility]        Today's Diagnoses     Need for hepatitis A immunization    -  1    Need for vaccination           Follow-ups after your visit        Who to contact     If you have questions or need follow up information about today's clinic visit or your schedule please contact [facility] directly at [phone].  Normal or non-critical lab and imaging results will be communicated to you by CloudPassagehart, letter or phone within 4 business days after the clinic has received the results. If you do not hear from us within 7 days, please contact the clinic through Deltasightt or phone. If you have a critical or abnormal lab result, we will notify you by phone as soon as possible.  Submit refill requests through Rockabox or call your pharmacy and they will forward the refill request to us. Please allow 3 business days for your refill to be completed.          Additional Information About Your Visit        MyChart Information     Rockabox lets you send messages to your doctor, view your test results, renew your prescriptions, schedule appointments and more. To sign up, go to [email or web address] . Click on \"Log in\" on the left side of the screen, which will take you to the Welcome page. Then click on \"Sign up Now\" on the right side of the page.     You will be asked to enter the access code listed below, as well as some personal information. Please follow the directions to create your username and password.     Your access code is: [email or web address]  Expires: 9/10/2018 10:11 AM     Your access code will  in 90 days. If you need help or a new code, please call your [place] clinic or [phone].        Care EveryWhere ID     This is your Care EveryWhere ID. This could be " used by other organizations to access your [place] medical records  [id number]         Blood Pressure from Last 3 Encounters:   05/14/18 124/74   03/02/18 123/79   06/29/17 119/73    Weight from Last 3 Encounters:   05/14/18 187 lb 8 oz (85 kg)   03/02/18 189 lb 9.6 oz (86 kg)   07/18/16 190 lb (86.2 kg)              We Performed the Following     HEPA VACCINE ADULT IM     VACCINE ADMINISTRATION, INITIAL        Primary Care Provider Office Phone # Fax #    [facility] [phone] [address] [facility] [address]        Equal Access to Services     ALEXANDRE ZAMAN : Hadii ranjit chano Souzma, waaxda luqadaha, qaybta kaalmada ademikyada, walter miranda . So [facility] [phone].    ATENCIÓN: Si habla español, tiene a villareal disposición servicios gratuitos de asistencia lingüística. [facility] [phone].    We comply with applicable federal civil rights laws and [place] laws. We do not discriminate on the basis of race, color, national origin, age, disability, sex, sexual orientation, or gender identity.            Thank you!     [facility] you for [facility] your care. Our goal is always to provide you with excellent care. Hearing back from our patients is one way we can continue to improve our services. Please take a few minutes to complete the written survey that you may receive in the mail after your visit with us. Thank you!             Your Updated Medication List - Protect others around you: Learn how to safely use, store and throw away your medicines at www.disposemymeds.org.          This list is accurate as of 6/12/18 10:11 AM.  Always use your most recent med list.                   Brand Name Dispense Instructions for use Diagnosis    rizatriptan 10 MG ODT tab    MAXALT-MLT    6 tablet    Take 1 tablet (10 mg) by mouth at onset of headache for migraine May repeat in 2 hours. Max 3 tablets/24 hours.    Migraine with aura and  without status migrainosus, not intractable

## 2018-06-12 NOTE — NURSING NOTE
"Chief Complaint   Patient presents with     Allied Health Visit     Imm/Inj     Hep A      There were no vitals taken for this visit. Estimated body mass index is 20.6 kg/(m^2) as calculated from the following:    Height as of 5/14/18: 6' 8\" (2.032 m).    Weight as of 5/14/18: 187 lb 8 oz (85 kg).  bp completed using cuff size: NA (Not Taken)       Health Maintenance addressed:  NONE    n/a    Astrid Esquivel MA     "

## 2018-06-12 NOTE — PROGRESS NOTES
Screening Questionnaire for Adult Immunization    Are you sick today?   No   Do you have allergies to medications, food, a vaccine component or latex?   No   Have you ever had a serious reaction after receiving a vaccination?   No   Do you have a long-term health problem with heart disease, lung disease, asthma, kidney disease, metabolic disease (e.g. diabetes), anemia, or other blood disorder?   No   Do you have cancer, leukemia, HIV/AIDS, or any other immune system problem?   No   In the past 3 months, have you taken medications that affect  your immune system, such as prednisone, other steroids, or anticancer drugs; drugs for the treatment of rheumatoid arthritis, Crohn s disease, or psoriasis; or have you had radiation treatments?   No   Have you had a seizure, or a brain or other nervous system problem?   No   During the past year, have you received a transfusion of blood or blood     products, or been given immune (gamma) globulin or antiviral drug?   No   For women: Are you pregnant or is there a chance you could become        pregnant during the next month?   No   Have you received any vaccinations in the past 4 weeks?   No     Immunization questionnaire answers were all negative.      Prior to injection verified patient identity using patient's name and date of birth.  Due to injection administration, patient instructed to remain in clinic for 15 minutes  afterwards, and to report any adverse reaction to me immediately.      Per orders of LAURY Ang, injection of Hep A given by Astrid Esquivel. Patient instructed to remain in clinic for 15 minutes afterwards, and to report any adverse reaction to me immediately.       Screening performed by Astrid Esquivel on 6/12/2018 at 10:09 AM.

## 2019-07-01 ENCOUNTER — HOSPITAL ENCOUNTER (EMERGENCY)
Facility: CLINIC | Age: 39
Discharge: HOME | End: 2019-07-01
Attending: FAMILY MEDICINE | Admitting: FAMILY MEDICINE
Payer: COMMERCIAL

## 2019-07-01 VITALS
DIASTOLIC BLOOD PRESSURE: 84 MMHG | WEIGHT: 178 LBS | SYSTOLIC BLOOD PRESSURE: 122 MMHG | RESPIRATION RATE: 16 BRPM | TEMPERATURE: 97.2 F | HEIGHT: 78 IN | BODY MASS INDEX: 20.59 KG/M2 | OXYGEN SATURATION: 100 %

## 2019-07-01 DIAGNOSIS — A09 DIARRHEA OF INFECTIOUS ORIGIN: ICD-10-CM

## 2019-07-01 LAB
ALBUMIN SERPL-MCNC: 3.8 G/DL (ref 3.4–5)
ALP SERPL-CCNC: 56 U/L (ref 40–150)
ALT SERPL W P-5'-P-CCNC: 22 U/L (ref 0–70)
ANION GAP SERPL CALCULATED.3IONS-SCNC: 4 MMOL/L (ref 3–14)
AST SERPL W P-5'-P-CCNC: 33 U/L (ref 0–45)
BASOPHILS # BLD AUTO: 0 10E9/L (ref 0–0.2)
BASOPHILS NFR BLD AUTO: 0.5 %
BILIRUB DIRECT SERPL-MCNC: 0.3 MG/DL (ref 0–0.2)
BILIRUB SERPL-MCNC: 1.5 MG/DL (ref 0.2–1.3)
BUN SERPL-MCNC: 14 MG/DL (ref 7–30)
CALCIUM SERPL-MCNC: 8.8 MG/DL (ref 8.5–10.1)
CHLORIDE SERPL-SCNC: 108 MMOL/L (ref 94–109)
CO2 SERPL-SCNC: 29 MMOL/L (ref 20–32)
CREAT SERPL-MCNC: 0.83 MG/DL (ref 0.66–1.25)
DIFFERENTIAL METHOD BLD: NORMAL
EOSINOPHIL # BLD AUTO: 0.1 10E9/L (ref 0–0.7)
EOSINOPHIL NFR BLD AUTO: 1.6 %
ERYTHROCYTE [DISTWIDTH] IN BLOOD BY AUTOMATED COUNT: 12.7 % (ref 10–15)
GFR SERPL CREATININE-BSD FRML MDRD: >90 ML/MIN/{1.73_M2}
GLUCOSE SERPL-MCNC: 83 MG/DL (ref 70–99)
HCT VFR BLD AUTO: 44.6 % (ref 40–53)
HGB BLD-MCNC: 15 G/DL (ref 13.3–17.7)
IMM GRANULOCYTES # BLD: 0 10E9/L (ref 0–0.4)
IMM GRANULOCYTES NFR BLD: 0.2 %
LYMPHOCYTES # BLD AUTO: 1.1 10E9/L (ref 0.8–5.3)
LYMPHOCYTES NFR BLD AUTO: 24.1 %
MCH RBC QN AUTO: 29.3 PG (ref 26.5–33)
MCHC RBC AUTO-ENTMCNC: 33.6 G/DL (ref 31.5–36.5)
MCV RBC AUTO: 87 FL (ref 78–100)
MONOCYTES # BLD AUTO: 0.6 10E9/L (ref 0–1.3)
MONOCYTES NFR BLD AUTO: 13.2 %
NEUTROPHILS # BLD AUTO: 2.7 10E9/L (ref 1.6–8.3)
NEUTROPHILS NFR BLD AUTO: 60.4 %
NRBC # BLD AUTO: 0 10*3/UL
NRBC BLD AUTO-RTO: 0 /100
PLATELET # BLD AUTO: 188 10E9/L (ref 150–450)
POTASSIUM SERPL-SCNC: 3.5 MMOL/L (ref 3.4–5.3)
PROT SERPL-MCNC: 7.1 G/DL (ref 6.8–8.8)
RBC # BLD AUTO: 5.12 10E12/L (ref 4.4–5.9)
SODIUM SERPL-SCNC: 141 MMOL/L (ref 133–144)
WBC # BLD AUTO: 4.4 10E9/L (ref 4–11)

## 2019-07-01 PROCEDURE — 80053 COMPREHEN METABOLIC PANEL: CPT | Performed by: FAMILY MEDICINE

## 2019-07-01 PROCEDURE — 99282 EMERGENCY DEPT VISIT SF MDM: CPT | Mod: Z6 | Performed by: FAMILY MEDICINE

## 2019-07-01 PROCEDURE — 36415 COLL VENOUS BLD VENIPUNCTURE: CPT | Performed by: FAMILY MEDICINE

## 2019-07-01 PROCEDURE — 85025 COMPLETE CBC W/AUTO DIFF WBC: CPT | Performed by: FAMILY MEDICINE

## 2019-07-01 PROCEDURE — 99283 EMERGENCY DEPT VISIT LOW MDM: CPT | Performed by: FAMILY MEDICINE

## 2019-07-01 PROCEDURE — 82248 BILIRUBIN DIRECT: CPT | Performed by: FAMILY MEDICINE

## 2019-07-01 ASSESSMENT — ENCOUNTER SYMPTOMS
SORE THROAT: 0
BLOOD IN STOOL: 0
DYSURIA: 0
SHORTNESS OF BREATH: 0
HEADACHES: 0
FREQUENCY: 0
CHILLS: 0
COUGH: 0
ABDOMINAL PAIN: 1
PALPITATIONS: 0
CONSTIPATION: 0
DIAPHORESIS: 0
VOMITING: 1
FEVER: 0
DIARRHEA: 1
SINUS PRESSURE: 0
NAUSEA: 1
WHEEZING: 0

## 2019-07-01 ASSESSMENT — MIFFLIN-ST. JEOR: SCORE: 1886.53

## 2019-07-01 NOTE — ED AVS SNAPSHOT
[facility] Emergency Department  [address] [address]  Phone:  [phone]  Fax:  [phone]                                    Eduardo Casanova   MRN: [id number]    Department:  [facility] Emergency Department   Date of Visit:  7/1/2019           After Visit Summary Signature Page    I have received my discharge instructions, and my questions have been answered. I have discussed any challenges I see with this plan with the nurse or doctor.    ..........................................................................................................................................  Patient/Patient Representative Signature      ..........................................................................................................................................  Patient Representative Print Name and Relationship to Patient    ..................................................               ................................................  Date                                   Time    ..........................................................................................................................................  Reviewed by Signature/Title    ...................................................              ..............................................  Date                                               Time          22EPIC Rev 08/18

## 2019-07-01 NOTE — ED NOTES
Pt just got home from [place] yesterday and had diarrhea and vomiting there.   Pt took immodium to stop diarrhea to get home and it did work.   Pt continues to have diarrhea.  Pt breakfast sandwich yesterday, sandwich and frosted flakes this morning.   Denies abdominal pain at this time.    Pt denies difficulty keeping liquids down

## 2019-07-01 NOTE — ED PROVIDER NOTES
History     Chief Complaint   Patient presents with     Diarrhea     x 5 days with abd pain at times-n/v Thursday and Nnwuvl-tstrlvjedjr-ozmf 12 lbs past week-was in [place] past week     HPI  Eduardo Casanova is a 38 year old male who returns for trip to [place] - there for 9 days, returned last night.  developed last  thursday, started with diarrhea - up to 20/day - no blood in the stool.  yellow liquid.  vomiting on thursday - friday and then resolved.  No fever. lost 12 pounds over the week. drinking electrolyte solution since then.  urinated once this am.  4-5 stools so far today - decreasing in frequency to some extent.  No recent antibiotics - but was given antibiotic while there and immodium.  abd pain mild cramping    does not know which food affected the diarrhea.      healthy without underlying medical conditions.  The patient denies dysuria, frequency or hematuria.     Allergies:  No Known Allergies    Problem List:    There are no active problems to display for this patient.       Past Medical History:    Past Medical History:   Diagnosis Date     NO ACTIVE PROBLEMS        Past Surgical History:    Past Surgical History:   Procedure Laterality Date     wisdom teeth         Family History:    Family History   Problem Relation Age of Onset     Hypertension Mother      Diabetes Father      Coronary Artery Disease Maternal Grandmother        Social History:  Marital Status:   [2]  Social History     Tobacco Use     Smoking status: Never Smoker     Smokeless tobacco: Never Used   Substance Use Topics     Alcohol use: Not on file     Comment: Occasionally     Drug use: No        Medications:      rizatriptan (MAXALT-MLT) 10 MG ODT tab         Review of Systems   Constitutional: Negative for chills, diaphoresis and fever.   HENT: Negative for ear pain, sinus pressure and sore throat.    Eyes: Negative for visual disturbance.   Respiratory: Negative for cough, shortness of breath  "and wheezing.    Cardiovascular: Negative for chest pain and palpitations.   Gastrointestinal: Positive for abdominal pain, diarrhea, nausea and vomiting. Negative for blood in stool and constipation.   Genitourinary: Negative for dysuria, frequency and urgency.   Skin: Negative for rash.   Neurological: Negative for headaches.   All other systems reviewed and are negative.      Physical Exam   BP: 124/86  Heart Rate: 65  Temp: 97.2  F (36.2  C)  Resp: 16  Height: 200.7 cm (6' 7\")  Weight: 80.7 kg (178 lb)  SpO2: 100 %      Physical Exam   Constitutional: He appears distressed.   HENT:   Mouth/Throat: Oropharynx is clear and moist.   Eyes: Conjunctivae are normal.   Neck: Neck supple.   Cardiovascular: Normal rate and regular rhythm. Exam reveals no friction rub.   No murmur heard.  Pulmonary/Chest: Effort normal and breath sounds normal. No stridor. No respiratory distress. He has no wheezes.   Abdominal: Soft. Bowel sounds are normal. He exhibits no distension and no mass. There is no tenderness. There is no rebound and no guarding.   Musculoskeletal: He exhibits no edema.   Neurological: He exhibits normal muscle tone.   Skin: No rash noted. He is not diaphoretic. No pallor.       ED Course        Procedures               Critical Care time:  none               Results for orders placed or performed during the hospital encounter of 07/01/19 (from the past 24 hour(s))   Comprehensive metabolic panel   Result Value Ref Range    Sodium 141 133 - 144 mmol/L    Potassium 3.5 3.4 - 5.3 mmol/L    Chloride 108 94 - 109 mmol/L    Carbon Dioxide 29 20 - 32 mmol/L    Anion Gap 4 3 - 14 mmol/L    Glucose 83 70 - 99 mg/dL    Urea Nitrogen 14 7 - 30 mg/dL    Creatinine 0.83 0.66 - 1.25 mg/dL    GFR Estimate >90 >60 mL/min/[1.73_m2]    GFR Estimate If Black >90 >60 mL/min/[1.73_m2]    Calcium 8.8 8.5 - 10.1 mg/dL    Bilirubin Total 1.5 (H) 0.2 - 1.3 mg/dL    Albumin 3.8 3.4 - 5.0 g/dL    Protein Total 7.1 6.8 - 8.8 g/dL    " Alkaline Phosphatase 56 40 - 150 U/L    ALT 22 0 - 70 U/L    AST 33 0 - 45 U/L   CBC with platelets, differential   Result Value Ref Range    WBC 4.4 4.0 - 11.0 10e9/L    RBC Count 5.12 4.4 - 5.9 10e12/L    Hemoglobin 15.0 13.3 - 17.7 g/dL    Hematocrit 44.6 40.0 - 53.0 %    MCV 87 78 - 100 fl    MCH 29.3 26.5 - 33.0 pg    MCHC 33.6 31.5 - 36.5 g/dL    RDW 12.7 10.0 - 15.0 %    Platelet Count 188 150 - 450 10e9/L    Diff Method Automated Method     % Neutrophils 60.4 %    % Lymphocytes 24.1 %    % Monocytes 13.2 %    % Eosinophils 1.6 %    % Basophils 0.5 %    % Immature Granulocytes 0.2 %    Nucleated RBCs 0 0 /100    Absolute Neutrophil 2.7 1.6 - 8.3 10e9/L    Absolute Lymphocytes 1.1 0.8 - 5.3 10e9/L    Absolute Monocytes 0.6 0.0 - 1.3 10e9/L    Absolute Eosinophils 0.1 0.0 - 0.7 10e9/L    Absolute Basophils 0.0 0.0 - 0.2 10e9/L    Abs Immature Granulocytes 0.0 0 - 0.4 10e9/L    Absolute Nucleated RBC 0.0    Bilirubin direct   Result Value Ref Range    Bilirubin Direct 0.3 (H) 0.0 - 0.2 mg/dL       Medications - No data to display    Assessments & Plan (with Medical Decision Making)     MDM: Eduardo Casanova is a 38 year old male   Who presented after trip to the [place] during which he experienced severe diarrhea that was nonbloody and without fever.  He had abdominal cramping but had a benign abdomen on his evaluation and was afebrile.  His amount of diarrhea was significant he had experienced up to 20 stools for the first couple of days and his electrolyte replacement did not sound as if it was adequate.  Therefore we did discuss obtaining electrolyte levels which were reassuring.  He did have a borderline low potassium.  Other testing is reassuring.  We discussed that given the degree of his diarrhea that he could perform RUBÉN stool testing but my suspicion for more significant causes of diarrhea is relatively low and his diarrhea is improving.  Again his abdomen is benign.  We discussed home  management of gastroenteritis and I given precautions for return.  I have reviewed the nursing notes.    I have reviewed the findings, diagnosis, plan and need for follow up with the patient.          Medication List      There are no discharge medications for this visit.         Final diagnoses:   Diarrhea of infectious origin - consider stool testing ahnd bring back - this has been ordered.  stay hydrated.  replace each stool with equivalent amounts of electrolyte.  other hydration may be with water. return for fever, abd pain, blood in the stool.       7/1/2019   [facility] EMERGENCY DEPARTMENT     Kyree Evans MD  07/02/19 0006

## 2020-10-13 NOTE — DISCHARGE INSTRUCTIONS
ICD-10-CM    1. Diarrhea of infectious origin A09 Comprehensive metabolic panel     CBC with platelets, differential     Enteric Bacteria and Virus Panel by RUBÉN Stool    consider stool testing ahnd bring back - this has been ordered.  stay hydrated.  replace each stool with equivalent amounts of electrolyte.  other hydration may be with water. return for fever, abd pain, blood in the stool.        none

## 2021-06-20 ENCOUNTER — APPOINTMENT (OUTPATIENT)
Dept: CT IMAGING | Facility: CLINIC | Age: 41
End: 2021-06-20
Attending: EMERGENCY MEDICINE
Payer: COMMERCIAL

## 2021-06-20 ENCOUNTER — HOSPITAL ENCOUNTER (EMERGENCY)
Facility: CLINIC | Age: 41
Discharge: HOME | End: 2021-06-20
Attending: EMERGENCY MEDICINE | Admitting: EMERGENCY MEDICINE
Payer: COMMERCIAL

## 2021-06-20 VITALS
HEART RATE: 58 BPM | SYSTOLIC BLOOD PRESSURE: 131 MMHG | TEMPERATURE: 98 F | OXYGEN SATURATION: 100 % | DIASTOLIC BLOOD PRESSURE: 88 MMHG

## 2021-06-20 DIAGNOSIS — R10.9 FLANK PAIN: ICD-10-CM

## 2021-06-20 LAB
ALBUMIN SERPL-MCNC: 4.2 G/DL (ref 3.4–5)
ALBUMIN UR-MCNC: NEGATIVE MG/DL
ALP SERPL-CCNC: 62 U/L (ref 40–150)
ALT SERPL W P-5'-P-CCNC: 25 U/L (ref 0–70)
ANION GAP SERPL CALCULATED.3IONS-SCNC: 6 MMOL/L (ref 3–14)
APPEARANCE UR: CLEAR
AST SERPL W P-5'-P-CCNC: 39 U/L (ref 0–45)
BASOPHILS # BLD AUTO: 0 10E9/L (ref 0–0.2)
BASOPHILS NFR BLD AUTO: 0.4 %
BILIRUB SERPL-MCNC: 1.6 MG/DL (ref 0.2–1.3)
BILIRUB UR QL STRIP: NEGATIVE
BUN SERPL-MCNC: 16 MG/DL (ref 7–30)
CALCIUM SERPL-MCNC: 8.9 MG/DL (ref 8.5–10.1)
CHLORIDE SERPL-SCNC: 106 MMOL/L (ref 94–109)
CO2 SERPL-SCNC: 28 MMOL/L (ref 20–32)
COLOR UR AUTO: YELLOW
CREAT SERPL-MCNC: 1.26 MG/DL (ref 0.66–1.25)
DIFFERENTIAL METHOD BLD: ABNORMAL
EOSINOPHIL # BLD AUTO: 0.1 10E9/L (ref 0–0.7)
EOSINOPHIL NFR BLD AUTO: 0.4 %
ERYTHROCYTE [DISTWIDTH] IN BLOOD BY AUTOMATED COUNT: 12.6 % (ref 10–15)
GFR SERPL CREATININE-BSD FRML MDRD: 71 ML/MIN/{1.73_M2}
GLUCOSE SERPL-MCNC: 115 MG/DL (ref 70–99)
GLUCOSE UR STRIP-MCNC: NEGATIVE MG/DL
HCT VFR BLD AUTO: 42.5 % (ref 40–53)
HGB BLD-MCNC: 14.5 G/DL (ref 13.3–17.7)
HGB UR QL STRIP: NEGATIVE
IMM GRANULOCYTES # BLD: 0 10E9/L (ref 0–0.4)
IMM GRANULOCYTES NFR BLD: 0.4 %
KETONES UR STRIP-MCNC: 20 MG/DL
LEUKOCYTE ESTERASE UR QL STRIP: NEGATIVE
LIPASE SERPL-CCNC: 155 U/L (ref 73–393)
LYMPHOCYTES # BLD AUTO: 0.9 10E9/L (ref 0.8–5.3)
LYMPHOCYTES NFR BLD AUTO: 8.3 %
MCH RBC QN AUTO: 30 PG (ref 26.5–33)
MCHC RBC AUTO-ENTMCNC: 34.1 G/DL (ref 31.5–36.5)
MCV RBC AUTO: 88 FL (ref 78–100)
MONOCYTES # BLD AUTO: 0.7 10E9/L (ref 0–1.3)
MONOCYTES NFR BLD AUTO: 5.9 %
NEUTROPHILS # BLD AUTO: 9.5 10E9/L (ref 1.6–8.3)
NEUTROPHILS NFR BLD AUTO: 84.6 %
NITRATE UR QL: NEGATIVE
NRBC # BLD AUTO: 0 10*3/UL
NRBC BLD AUTO-RTO: 0 /100
PH UR STRIP: 5 PH (ref 5–7)
PLATELET # BLD AUTO: 201 10E9/L (ref 150–450)
POTASSIUM SERPL-SCNC: 3.7 MMOL/L (ref 3.4–5.3)
PROT SERPL-MCNC: 7.2 G/DL (ref 6.8–8.8)
RBC # BLD AUTO: 4.84 10E12/L (ref 4.4–5.9)
SODIUM SERPL-SCNC: 140 MMOL/L (ref 133–144)
SOURCE: ABNORMAL
SP GR UR STRIP: 1.03 (ref 1–1.03)
UROBILINOGEN UR STRIP-MCNC: 0 MG/DL (ref 0–2)
WBC # BLD AUTO: 11.2 10E9/L (ref 4–11)

## 2021-06-20 PROCEDURE — 83690 ASSAY OF LIPASE: CPT | Performed by: EMERGENCY MEDICINE

## 2021-06-20 PROCEDURE — 250N000011 HC RX IP 250 OP 636: Performed by: EMERGENCY MEDICINE

## 2021-06-20 PROCEDURE — 96361 HYDRATE IV INFUSION ADD-ON: CPT | Performed by: EMERGENCY MEDICINE

## 2021-06-20 PROCEDURE — 250N000009 HC RX 250: Performed by: EMERGENCY MEDICINE

## 2021-06-20 PROCEDURE — 74177 CT ABD & PELVIS W/CONTRAST: CPT

## 2021-06-20 PROCEDURE — 96374 THER/PROPH/DIAG INJ IV PUSH: CPT | Mod: 59 | Performed by: EMERGENCY MEDICINE

## 2021-06-20 PROCEDURE — 96375 TX/PRO/DX INJ NEW DRUG ADDON: CPT | Performed by: EMERGENCY MEDICINE

## 2021-06-20 PROCEDURE — 258N000003 HC RX IP 258 OP 636: Performed by: EMERGENCY MEDICINE

## 2021-06-20 PROCEDURE — 81003 URINALYSIS AUTO W/O SCOPE: CPT | Performed by: EMERGENCY MEDICINE

## 2021-06-20 PROCEDURE — 85025 COMPLETE CBC W/AUTO DIFF WBC: CPT | Performed by: EMERGENCY MEDICINE

## 2021-06-20 PROCEDURE — 99285 EMERGENCY DEPT VISIT HI MDM: CPT | Mod: 25 | Performed by: EMERGENCY MEDICINE

## 2021-06-20 PROCEDURE — 99285 EMERGENCY DEPT VISIT HI MDM: CPT | Performed by: EMERGENCY MEDICINE

## 2021-06-20 PROCEDURE — 80053 COMPREHEN METABOLIC PANEL: CPT | Performed by: EMERGENCY MEDICINE

## 2021-06-20 PROCEDURE — 250N000013 HC RX MED GY IP 250 OP 250 PS 637: Performed by: EMERGENCY MEDICINE

## 2021-06-20 RX ORDER — ONDANSETRON 4 MG/1
4 TABLET, ORALLY DISINTEGRATING ORAL EVERY 6 HOURS PRN
Qty: 10 TABLET | Refills: 0 | Status: SHIPPED | OUTPATIENT
Start: 2021-06-20 | End: 2021-06-27

## 2021-06-20 RX ORDER — SODIUM CHLORIDE 9 MG/ML
INJECTION, SOLUTION INTRAVENOUS CONTINUOUS
Status: DISCONTINUED | OUTPATIENT
Start: 2021-06-20 | End: 2021-06-20 | Stop reason: HOSPADM

## 2021-06-20 RX ORDER — ONDANSETRON 2 MG/ML
4 INJECTION INTRAMUSCULAR; INTRAVENOUS EVERY 30 MIN PRN
Status: DISCONTINUED | OUTPATIENT
Start: 2021-06-20 | End: 2021-06-20 | Stop reason: HOSPADM

## 2021-06-20 RX ORDER — HYDROCODONE BITARTRATE AND ACETAMINOPHEN 5; 325 MG/1; MG/1
2 TABLET ORAL ONCE
Status: COMPLETED | OUTPATIENT
Start: 2021-06-20 | End: 2021-06-20

## 2021-06-20 RX ORDER — TAMSULOSIN HYDROCHLORIDE 0.4 MG/1
0.4 CAPSULE ORAL DAILY
Qty: 7 CAPSULE | Refills: 0 | Status: SHIPPED | OUTPATIENT
Start: 2021-06-20 | End: 2021-06-27

## 2021-06-20 RX ORDER — MORPHINE SULFATE 4 MG/ML
4 INJECTION, SOLUTION INTRAMUSCULAR; INTRAVENOUS
Status: DISCONTINUED | OUTPATIENT
Start: 2021-06-20 | End: 2021-06-20 | Stop reason: HOSPADM

## 2021-06-20 RX ORDER — IOPAMIDOL 755 MG/ML
86 INJECTION, SOLUTION INTRAVASCULAR ONCE
Status: COMPLETED | OUTPATIENT
Start: 2021-06-20 | End: 2021-06-20

## 2021-06-20 RX ORDER — HYDROCODONE BITARTRATE AND ACETAMINOPHEN 5; 325 MG/1; MG/1
1 TABLET ORAL EVERY 4 HOURS PRN
Qty: 16 TABLET | Refills: 0 | Status: SHIPPED | OUTPATIENT
Start: 2021-06-20 | End: 2022-05-25

## 2021-06-20 RX ORDER — SODIUM CHLORIDE 9 MG/ML
INJECTION, SOLUTION INTRAVENOUS CONTINUOUS
Status: DISCONTINUED | OUTPATIENT
Start: 2021-06-20 | End: 2021-06-20

## 2021-06-20 RX ORDER — KETOROLAC TROMETHAMINE 15 MG/ML
15 INJECTION, SOLUTION INTRAMUSCULAR; INTRAVENOUS ONCE
Status: COMPLETED | OUTPATIENT
Start: 2021-06-20 | End: 2021-06-20

## 2021-06-20 RX ADMIN — SODIUM CHLORIDE 62 ML: 9 INJECTION, SOLUTION INTRAVENOUS at 04:34

## 2021-06-20 RX ADMIN — HYDROCODONE BITARTRATE AND ACETAMINOPHEN 2 TABLET: 5; 325 TABLET ORAL at 05:39

## 2021-06-20 RX ADMIN — KETOROLAC TROMETHAMINE 15 MG: 15 INJECTION, SOLUTION INTRAMUSCULAR; INTRAVENOUS at 03:21

## 2021-06-20 RX ADMIN — SODIUM CHLORIDE 1000 ML: 9 INJECTION, SOLUTION INTRAVENOUS at 03:21

## 2021-06-20 RX ADMIN — IOPAMIDOL 86 ML: 755 INJECTION, SOLUTION INTRAVENOUS at 04:34

## 2021-06-20 RX ADMIN — MORPHINE SULFATE 4 MG: 4 INJECTION, SOLUTION INTRAMUSCULAR; INTRAVENOUS at 04:59

## 2021-06-20 ASSESSMENT — ENCOUNTER SYMPTOMS
FLANK PAIN: 1
SHORTNESS OF BREATH: 0
FEVER: 0
ABDOMINAL PAIN: 0

## 2021-06-20 NOTE — ED PROVIDER NOTES
History     Chief Complaint   Patient presents with     Abdominal Pain     radiates to right flank     HPI  Eduardo Casanova is a 40 year old male who is otherwise healthy, presenting the emergency department with his wife for concerns regarding right lower quadrant abdominal pain.  Does have slight radiation towards the right groin, in addition to the right flank.  Symptoms began as patient was attempting to go to bed tonight.  Has been crampy in nature, with some sharp, stabbing episodes of pain.  No left-sided symptoms.  No history of surgical procedures.  Did not take any medications prior to arrival.  Denies any urinary symptoms.  No history of kidney stones.  No chest pain, cough, shortness of breath.  Has had perhaps slight nausea, with no vomiting.    Allergies:  No Known Allergies    Problem List:    There are no active problems to display for this patient.       Past Medical History:    Past Medical History:   Diagnosis Date     NO ACTIVE PROBLEMS        Past Surgical History:    Past Surgical History:   Procedure Laterality Date     wisdom teeth         Family History:    Family History   Problem Relation Age of Onset     Hypertension Mother      Diabetes Father      Coronary Artery Disease Maternal Grandmother        Social History:  Marital Status:   [2]  Social History     Tobacco Use     Smoking status: Never Smoker     Smokeless tobacco: Never Used   Substance Use Topics     Alcohol use: Yes     Alcohol/week: 0.0 standard drinks     Comment: Occasionally     Drug use: No        Medications:    HYDROcodone-acetaminophen (NORCO) 5-325 MG tablet  ondansetron (ZOFRAN ODT) 4 MG ODT tab  tamsulosin (FLOMAX) 0.4 MG capsule          Review of Systems   Constitutional: Negative for fever.   Respiratory: Negative for shortness of breath.    Cardiovascular: Negative for chest pain.   Gastrointestinal: Negative for abdominal pain.   Genitourinary: Positive for flank pain.   All other systems reviewed and  are negative.      Physical Exam   BP: 139/86  Pulse: 60  Temp: 98  F (36.7  C)  SpO2: 100 %      Physical Exam  /88   Pulse 58   Temp 98  F (36.7  C) (Oral)   SpO2 100%   General: alert, interactive, uncomfortable appearing.    Head: atraumatic   Nose: no rhinorrhea or epistaxis  Ears: no external auditory canal discharge or bleeding.    Eyes: Sclera nonicteric. Conjunctiva noninjected. PERRL, EOMI  Mouth: no tonsillar erythema, edema, or exudate  Neck: supple, no palp LAD  Lungs: CTAB  CV: RRR, S1/S2; peripheral pulses palpable and symmetric  Abdomen: soft, very minimal right lower quadrant tenderness, nd, no guarding or rebound. Positive bowel sounds  Extremities: no cyanosis or edema  Skin: no rash or diaphoresis  Neuro:  strength 5/5 in UE and LEs bilaterally, sensation intact to light touch in UE and LEs bilaterally;       ED Course        Procedures               Critical Care time:  none               Results for orders placed or performed during the hospital encounter of 06/20/21 (from the past 24 hour(s))   CBC with platelets, differential   Result Value Ref Range    WBC 11.2 (H) 4.0 - 11.0 10e9/L    RBC Count 4.84 4.4 - 5.9 10e12/L    Hemoglobin 14.5 13.3 - 17.7 g/dL    Hematocrit 42.5 40.0 - 53.0 %    MCV 88 78 - 100 fl    MCH 30.0 26.5 - 33.0 pg    MCHC 34.1 31.5 - 36.5 g/dL    RDW 12.6 10.0 - 15.0 %    Platelet Count 201 150 - 450 10e9/L    Diff Method Automated Method     % Neutrophils 84.6 %    % Lymphocytes 8.3 %    % Monocytes 5.9 %    % Eosinophils 0.4 %    % Basophils 0.4 %    % Immature Granulocytes 0.4 %    Nucleated RBCs 0 0 /100    Absolute Neutrophil 9.5 (H) 1.6 - 8.3 10e9/L    Absolute Lymphocytes 0.9 0.8 - 5.3 10e9/L    Absolute Monocytes 0.7 0.0 - 1.3 10e9/L    Absolute Eosinophils 0.1 0.0 - 0.7 10e9/L    Absolute Basophils 0.0 0.0 - 0.2 10e9/L    Abs Immature Granulocytes 0.0 0 - 0.4 10e9/L    Absolute Nucleated RBC 0.0    Comprehensive metabolic panel   Result Value Ref Range     Sodium 140 133 - 144 mmol/L    Potassium 3.7 3.4 - 5.3 mmol/L    Chloride 106 94 - 109 mmol/L    Carbon Dioxide 28 20 - 32 mmol/L    Anion Gap 6 3 - 14 mmol/L    Glucose 115 (H) 70 - 99 mg/dL    Urea Nitrogen 16 7 - 30 mg/dL    Creatinine 1.26 (H) 0.66 - 1.25 mg/dL    GFR Estimate 71 >60 mL/min/[1.73_m2]    GFR Estimate If Black 83 >60 mL/min/[1.73_m2]    Calcium 8.9 8.5 - 10.1 mg/dL    Bilirubin Total 1.6 (H) 0.2 - 1.3 mg/dL    Albumin 4.2 3.4 - 5.0 g/dL    Protein Total 7.2 6.8 - 8.8 g/dL    Alkaline Phosphatase 62 40 - 150 U/L    ALT 25 0 - 70 U/L    AST 39 0 - 45 U/L   Lipase   Result Value Ref Range    Lipase 155 73 - 393 U/L   CT Abdomen Pelvis w Contrast    Narrative    EXAM: CT ABDOMEN AND PELVIS WITH CONTRAST  LOCATION: [facility]  DATE/TIME: 06/20/2021, 4:33 AM    INDICATION: Right lower quadrant abdominal pain.  COMPARISON: None.    TECHNIQUE: CT scan of the abdomen and pelvis was performed following injection of IV contrast. Multiplanar reformats were obtained. Dose reduction techniques were used.  CONTRAST: 86 mL Isovue-370.    FINDINGS:    LOWER CHEST: Unremarkable.    HEPATOBILIARY: Unremarkable.    SPLEEN: Unremarkable.    PANCREAS: Unremarkable.    ADRENAL GLANDS: Unremarkable.    KIDNEYS/BLADDER: 0.3 cm calculus in the distal right ureter, approximately 2 cm proximal to the ureterovesicular junction. Mild dilatation of the more proximal right ureter and intrarenal collecting system. Slight right perinephric and periureteral   haziness. Relative delay in enhancement of the right kidney.    BOWEL: Normal appendix.    LYMPH NODES: Unremarkable.    PELVIC ORGANS: No acute findings    MUSCULOSKELETAL: No acute findings.    OTHER: None.      Impression    IMPRESSION: 0.3 cm distal right ureteral calculus, resulting in mild obstruction.      UA reflex to Microscopic   Result Value Ref Range    Color Urine Yellow     Appearance Urine Clear     Glucose Urine Negative NEG^Negative mg/dL     Bilirubin Urine Negative NEG^Negative    Ketones Urine 20 (A) NEG^Negative mg/dL    Specific Gravity Urine 1.029 1.003 - 1.035    Blood Urine Negative NEG^Negative    pH Urine 5.0 5.0 - 7.0 pH    Protein Albumin Urine Negative NEG^Negative mg/dL    Urobilinogen mg/dL 0.0 0.0 - 2.0 mg/dL    Nitrite Urine Negative NEG^Negative    Leukocyte Esterase Urine Negative NEG^Negative    Source Midstream Urine        Medications   0.9% sodium chloride BOLUS (0 mLs Intravenous Stopped 6/20/21 0538)     Followed by   sodium chloride 0.9% infusion (has no administration in time range)   ondansetron (ZOFRAN) injection 4 mg (has no administration in time range)   morphine (PF) injection 4 mg (4 mg Intravenous Given 6/20/21 9649)   ketorolac (TORADOL) injection 15 mg (15 mg Intravenous Given 6/20/21 2891)   iopamidol (ISOVUE-370) solution 86 mL (86 mLs Intravenous Given 6/20/21 0434)   sodium chloride 0.9 % bag 500mL for CT scan flush use (62 mLs Intravenous Given 6/20/21 0434)   HYDROcodone-acetaminophen (NORCO) 5-325 MG per tablet 2 tablet (2 tablets Oral Given 6/20/21 0539)       Assessments & Plan (with Medical Decision Making)  40 year old male sending to the emergency department with concerns regarding right lower quadrant abdominal pain.  Symptoms began this evening.  No fevers.  Has had episode of nausea with vomiting.  Decreased appetite.  Differential includes UTI, pyelonephritis, kidney stone, ureteral colic, diverticulitis, appendicitis.    IV established, labs drawn, Toradol and IV fluids administered.  Urinalysis showing no signs of infection.  No blood is present.  CBC did have slightly elevated white blood cell count at 11.2.  Therefore, decision was made for CT imaging to rule out appendicitis as the cause.  CT results returned showing 3 mm distal right ureteral stone with mild amounts of hydronephrosis.  No signs of infected stone.  Patient was given Toradol, and antiemetics.  Did feel improved, however had  recurrent wave of pain.  Morphine and Norco administered.  Pain resolved, and plan is for discharge home with expectant management.  Counseled on kidney stones, with Zofran, Flomax, and Norco prescribed.     I have reviewed the nursing notes.    I have reviewed the findings, diagnosis, plan and need for follow up with the patient.       New Prescriptions    HYDROCODONE-ACETAMINOPHEN (NORCO) 5-325 MG TABLET    Take 1 tablet by mouth every 4 hours as needed for severe pain    ONDANSETRON (ZOFRAN ODT) 4 MG ODT TAB    Take 1 tablet (4 mg) by mouth every 6 hours as needed for nausea    TAMSULOSIN (FLOMAX) 0.4 MG CAPSULE    Take 1 capsule (0.4 mg) by mouth daily for 7 doses       Final diagnoses:   Flank pain       6/20/2021   [facility] EMERGENCY DEPT     DavidDominic vogt MD  06/20/21 0601

## 2021-06-20 NOTE — DISCHARGE INSTRUCTIONS
Continue tylenol and ibuprofen.    Alternate these medications every three hours as needed for fever.  (For example, tylenol at 8am, ibuprofen at 11am, tylenol at 2pm, ibuprofen at 5pm, tylenol at 8pm...)    Follow up as needed.

## 2021-06-20 NOTE — ED TRIAGE NOTES
Here with RLQ abdominal pain that radiates around to back & pelvis. Started around 2300, waxed & waned in intensity. Vomited x1 PTA, normal BM at 12am, no hx of stones, denies fevers.

## 2021-09-26 ENCOUNTER — HEALTH MAINTENANCE LETTER (OUTPATIENT)
Age: 41
End: 2021-09-26

## 2022-05-25 ENCOUNTER — OFFICE VISIT (OUTPATIENT)
Dept: FAMILY MEDICINE | Facility: CLINIC | Age: 42
End: 2022-05-25
Payer: COMMERCIAL

## 2022-05-25 VITALS
OXYGEN SATURATION: 100 % | SYSTOLIC BLOOD PRESSURE: 120 MMHG | RESPIRATION RATE: 16 BRPM | TEMPERATURE: 97.5 F | DIASTOLIC BLOOD PRESSURE: 72 MMHG | HEART RATE: 60 BPM | WEIGHT: 194.7 LBS | BODY MASS INDEX: 21.93 KG/M2

## 2022-05-25 DIAGNOSIS — G43.109 MIGRAINE WITH AURA AND WITHOUT STATUS MIGRAINOSUS, NOT INTRACTABLE: ICD-10-CM

## 2022-05-25 DIAGNOSIS — R73.09 ELEVATED GLUCOSE: ICD-10-CM

## 2022-05-25 DIAGNOSIS — G47.00 INSOMNIA, UNSPECIFIED TYPE: ICD-10-CM

## 2022-05-25 DIAGNOSIS — Z13.220 SCREENING FOR HYPERLIPIDEMIA: ICD-10-CM

## 2022-05-25 DIAGNOSIS — Z00.00 ENCOUNTER FOR ROUTINE ADULT HEALTH EXAMINATION WITHOUT ABNORMAL FINDINGS: Primary | ICD-10-CM

## 2022-05-25 DIAGNOSIS — L98.9 LESION OF SKIN OF NOSE: ICD-10-CM

## 2022-05-25 DIAGNOSIS — R79.89 ELEVATED SERUM CREATININE: ICD-10-CM

## 2022-05-25 DIAGNOSIS — R17 ELEVATED BILIRUBIN: ICD-10-CM

## 2022-05-25 LAB
ALBUMIN SERPL-MCNC: 3.8 G/DL (ref 3.4–5)
ALP SERPL-CCNC: 67 U/L (ref 40–150)
ALT SERPL W P-5'-P-CCNC: 22 U/L (ref 0–70)
ANION GAP SERPL CALCULATED.3IONS-SCNC: 4 MMOL/L (ref 3–14)
AST SERPL W P-5'-P-CCNC: 32 U/L (ref 0–45)
BASOPHILS # BLD AUTO: 0 10E3/UL (ref 0–0.2)
BASOPHILS NFR BLD AUTO: 1 %
BILIRUB DIRECT SERPL-MCNC: 0.2 MG/DL (ref 0–0.2)
BILIRUB SERPL-MCNC: 1.1 MG/DL (ref 0.2–1.3)
BUN SERPL-MCNC: 17 MG/DL (ref 7–30)
CALCIUM SERPL-MCNC: 8.9 MG/DL (ref 8.5–10.1)
CHLORIDE BLD-SCNC: 108 MMOL/L (ref 94–109)
CHOLEST SERPL-MCNC: 164 MG/DL
CO2 SERPL-SCNC: 31 MMOL/L (ref 20–32)
CREAT SERPL-MCNC: 0.93 MG/DL (ref 0.66–1.25)
EOSINOPHIL # BLD AUTO: 0.1 10E3/UL (ref 0–0.7)
EOSINOPHIL NFR BLD AUTO: 3 %
ERYTHROCYTE [DISTWIDTH] IN BLOOD BY AUTOMATED COUNT: 13.2 % (ref 10–15)
FASTING STATUS PATIENT QL REPORTED: YES
GFR SERPL CREATININE-BSD FRML MDRD: >90 ML/MIN/1.73M2
GLUCOSE BLD-MCNC: 99 MG/DL (ref 70–99)
HBA1C MFR BLD: 5.4 % (ref 0–5.6)
HCT VFR BLD AUTO: 44.1 % (ref 40–53)
HDLC SERPL-MCNC: 47 MG/DL
HGB BLD-MCNC: 14.6 G/DL (ref 13.3–17.7)
LDLC SERPL CALC-MCNC: 95 MG/DL
LYMPHOCYTES # BLD AUTO: 1.2 10E3/UL (ref 0.8–5.3)
LYMPHOCYTES NFR BLD AUTO: 25 %
MCH RBC QN AUTO: 29.4 PG (ref 26.5–33)
MCHC RBC AUTO-ENTMCNC: 33.1 G/DL (ref 31.5–36.5)
MCV RBC AUTO: 89 FL (ref 78–100)
MONOCYTES # BLD AUTO: 0.3 10E3/UL (ref 0–1.3)
MONOCYTES NFR BLD AUTO: 7 %
NEUTROPHILS # BLD AUTO: 3.1 10E3/UL (ref 1.6–8.3)
NEUTROPHILS NFR BLD AUTO: 64 %
NONHDLC SERPL-MCNC: 117 MG/DL
PLATELET # BLD AUTO: 211 10E3/UL (ref 150–450)
POTASSIUM BLD-SCNC: 4 MMOL/L (ref 3.4–5.3)
PROT SERPL-MCNC: 7.2 G/DL (ref 6.8–8.8)
RBC # BLD AUTO: 4.96 10E6/UL (ref 4.4–5.9)
SODIUM SERPL-SCNC: 143 MMOL/L (ref 133–144)
TRIGL SERPL-MCNC: 108 MG/DL
WBC # BLD AUTO: 4.8 10E3/UL (ref 4–11)

## 2022-05-25 PROCEDURE — 85025 COMPLETE CBC W/AUTO DIFF WBC: CPT | Performed by: PHYSICIAN ASSISTANT

## 2022-05-25 PROCEDURE — 83036 HEMOGLOBIN GLYCOSYLATED A1C: CPT | Performed by: PHYSICIAN ASSISTANT

## 2022-05-25 PROCEDURE — 80053 COMPREHEN METABOLIC PANEL: CPT | Performed by: PHYSICIAN ASSISTANT

## 2022-05-25 PROCEDURE — 36415 COLL VENOUS BLD VENIPUNCTURE: CPT | Performed by: PHYSICIAN ASSISTANT

## 2022-05-25 PROCEDURE — 82248 BILIRUBIN DIRECT: CPT | Performed by: PHYSICIAN ASSISTANT

## 2022-05-25 PROCEDURE — 80061 LIPID PANEL: CPT | Performed by: PHYSICIAN ASSISTANT

## 2022-05-25 PROCEDURE — 99385 PREV VISIT NEW AGE 18-39: CPT | Performed by: PHYSICIAN ASSISTANT

## 2022-05-25 ASSESSMENT — ENCOUNTER SYMPTOMS
SORE THROAT: 0
ABDOMINAL PAIN: 0
EYE PAIN: 0
DYSURIA: 0
DIZZINESS: 0
CHILLS: 0
NERVOUS/ANXIOUS: 0
MYALGIAS: 0
ARTHRALGIAS: 0
FREQUENCY: 0
HEADACHES: 0
HEMATURIA: 0
NAUSEA: 0
COUGH: 0
SHORTNESS OF BREATH: 0
WEAKNESS: 0
FEVER: 0
JOINT SWELLING: 0
PARESTHESIAS: 0
DIARRHEA: 0
PALPITATIONS: 0
HEMATOCHEZIA: 0
CONSTIPATION: 0
HEARTBURN: 0

## 2022-05-25 NOTE — PROGRESS NOTES
SUBJECTIVE:   CC: Eduardo Casanova is an 41 year old male who presents for preventative health visit.     Patient has been advised of split billing requirements and indicates understanding: Yes  Healthy Habits:     Getting at least 3 servings of Calcium per day:  Yes    Bi-annual eye exam:  Yes    Dental care twice a year:  Yes    Sleep apnea or symptoms of sleep apnea:  None    Diet:  Regular (no restrictions)    Frequency of exercise:  2-3 days/week    Duration of exercise:  30-45 minutes    Taking medications regularly:  Not Applicable    Medication side effects:  Not applicable    PHQ-2 Total Score: 0    Additional concerns today:  No      Sleep issues - insomnia, waking at night to urinate, mind racing and not able to fall back asleep  Started OTC sleep (L theanine, herbal, melatonin) - works well for him    Bump left side of nose for years, doesn't go away, bleeds sometimes  Has seen derm before, had shave biopsy from face but was normal    Migraines: essentially resolved. The triptan given a few years ago did not help    Today's PHQ-2 Score:   PHQ-2 ( 1999 [employer]) 5/25/2022   Q1: Little interest or pleasure in doing things 0   Q2: Feeling down, depressed or hopeless 0   PHQ-2 Score 0   Q1: Little interest or pleasure in doing things Not at all   Q2: Feeling down, depressed or hopeless Not at all   PHQ-2 Score 0       Abuse: Current or Past(Physical, Sexual or Emotional)- No  Do you feel safe in your environment? Yes    Have you ever done Advance Care Planning? (For example, a Health Directive, POLST, or a discussion with a medical provider or your loved ones about your wishes): No, advance care planning information given to patient to review.  Patient plans to discuss their wishes with loved ones or provider.      Social History     Tobacco Use     Smoking status: Never Smoker     Smokeless tobacco: Never Used   Substance Use Topics     Alcohol use: Yes     Alcohol/week: 0.0 standard drinks     Comment:  Occasionally     If you drink alcohol do you typically have >3 drinks per day or >7 drinks per week? No    Alcohol Use 5/25/2022   Prescreen: >3 drinks/day or >7 drinks/week? No   Prescreen: >3 drinks/day or >7 drinks/week? -   No flowsheet data found.    Last PSA: No results found for: PSA    Reviewed orders with patient. Reviewed health maintenance and updated orders accordingly - Yes  Lab work is in process  Labs reviewed in EPIC  BP Readings from Last 3 Encounters:   05/25/22 120/72   06/20/21 131/88   07/01/19 122/84    Wt Readings from Last 3 Encounters:   05/25/22 88.3 kg (194 lb 11.2 oz)   07/01/19 80.7 kg (178 lb)   05/14/18 85 kg (187 lb 8 oz)                  Patient Active Problem List   Diagnosis     Migraine with aura and without status migrainosus, not intractable     Past Surgical History:   Procedure Laterality Date     wisdom teeth         Social History     Tobacco Use     Smoking status: Never Smoker     Smokeless tobacco: Never Used   Substance Use Topics     Alcohol use: Yes     Alcohol/week: 0.0 standard drinks     Comment: Occasionally     Family History   Problem Relation Age of Onset     Hypertension Mother      Diabetes Father      Coronary Artery Disease Maternal Grandmother      Cancer No family hx of            Reviewed and updated as needed this visit by clinical staff   Tobacco  Allergies  Meds  Problems  Med Hx  Surg Hx  Fam Hx  Soc   Hx          Reviewed and updated as needed this visit by Provider   Tobacco  Allergies  Meds  Problems  Med Hx  Surg Hx  Fam Hx           Past Medical History:   Diagnosis Date     NO ACTIVE PROBLEMS       Past Surgical History:   Procedure Laterality Date     wisdom teeth         Review of Systems   Constitutional: Negative for chills and fever.   HENT: Negative for congestion, ear pain, hearing loss and sore throat.    Eyes: Negative for pain and visual disturbance.   Respiratory: Negative for cough and shortness of breath.     Cardiovascular: Negative for chest pain, palpitations and peripheral edema.   Gastrointestinal: Negative for abdominal pain, constipation, diarrhea, heartburn, hematochezia and nausea.   Genitourinary: Negative for dysuria, frequency, genital sores, hematuria, impotence, penile discharge and urgency.   Musculoskeletal: Negative for arthralgias, joint swelling and myalgias.   Skin: Negative for rash.   Neurological: Negative for dizziness, weakness, headaches and paresthesias.   Psychiatric/Behavioral: Negative for mood changes. The patient is not nervous/anxious.      OBJECTIVE:   /72   Pulse 60   Temp 97.5  F (36.4  C) (Tympanic)   Resp 16   Wt 88.3 kg (194 lb 11.2 oz)   SpO2 100%   BMI 21.93 kg/m      Physical Exam  GENERAL: healthy, alert and no distress  EYES: Eyes grossly normal to inspection, PERRL and conjunctivae and sclerae normal  HENT: ear canals and TM's normal, nose and mouth without ulcers or lesions  NECK: no adenopathy, no asymmetry, masses, or scars and thyroid normal to palpation  RESP: lungs clear to auscultation - no rales, rhonchi or wheezes  CV: regular rate and rhythm, normal S1 S2, no S3 or S4, no murmur, click or rub, no peripheral edema and peripheral pulses strong  ABDOMEN: soft, nontender, no hepatosplenomegaly, no masses and bowel sounds normal  MS: no gross musculoskeletal defects noted, no edema  SKIN: POSITIVE left alar crease of nose shows small slightly erythematous papule  NEURO: Normal strength and tone, mentation intact and speech normal  BACK: no CVA tenderness, no paralumbar tenderness  PSYCH: mentation appears normal, affect normal/bright  LYMPH: no cervical, supraclavicular, axillary, or inguinal adenopathy    Diagnostic Test Results:  Labs reviewed in Epic    ASSESSMENT/PLAN:     ASSESSMENT/PLAN:      ICD-10-CM    1. Encounter for routine adult health examination without abnormal findings  Z00.00    2. Migraine with aura and without status migrainosus, not  "intractable  G43.109    3. Screening for hyperlipidemia  Z13.220 Lipid panel reflex to direct LDL Non-fasting     Lipid panel reflex to direct LDL Non-fasting   4. Elevated serum creatinine  R79.89 Basic metabolic panel  (Ca, Cl, CO2, Creat, Gluc, K, Na, BUN)     Basic metabolic panel  (Ca, Cl, CO2, Creat, Gluc, K, Na, BUN)   5. Lesion of skin of nose  L98.9 Adult Dermatology Referral   6. Elevated glucose  R73.09 Hemoglobin A1c     Hemoglobin A1c   7. Elevated bilirubin  R17 Bilirubin Direct and Total     CBC with platelets and differential     Hepatic panel (Albumin, ALT, AST, Bili, Alk Phos, TP)     CBC with platelets and differential     Hepatic panel (Albumin, ALT, AST, Bili, Alk Phos, TP)     CANCELED: Bilirubin Direct and Total   8. Insomnia, unspecified type  G47.00        Will check labs - noted mildly elevated bilirubin on both previous CMP results from ER visits. Will check further labs today. Discussed, gave information regarding Gilbert syndrome  Will also recheck elevated creatinine during ER visit for kidney stone    Discussed insomnia, medications, trial not using every night      COUNSELING:   Reviewed preventive health counseling, as reflected in patient instructions       Regular exercise       Healthy diet/nutrition       Consider Hep C screening for all patients one time for ages 18-79 years       HIV screeninx in teen years, 1x in adult years, and at intervals if high risk       Colorectal cancer screening       Prostate cancer screening    Estimated body mass index is 21.93 kg/m  as calculated from the following:    Height as of 19: 2.007 m (6' 7\").    Weight as of this encounter: 88.3 kg (194 lb 11.2 oz).     He reports that he has never smoked. He has never used smokeless tobacco.      Counseling Resources:  ATP IV Guidelines  Pooled Cohorts Equation Calculator  FRAX Risk Assessment  ICSI Preventive Guidelines  Dietary Guidelines for Americans, 2010  USDA's MyPlate  ASA " Prophylaxis  Lung CA Screening    HAROON Mcfarland [facility]

## 2023-01-08 ENCOUNTER — HEALTH MAINTENANCE LETTER (OUTPATIENT)
Age: 43
End: 2023-01-08

## 2023-07-16 ENCOUNTER — HEALTH MAINTENANCE LETTER (OUTPATIENT)
Age: 43
End: 2023-07-16

## 2023-08-18 NOTE — PATIENT INSTRUCTIONS
Wrists: use comfortable braces at night. If needed, physical therapy for wrists would be a good next step    The following measures and instructions are designed to help you achieve good sleep:    Sleep Hygiene for people with trouble sleeping    Allow at least a 1-hour period to unwind before bedtime.  Reduce time in bed - if you are awake in bed more than 20 minutes and think that you will not fall asleep shortly, get out of bed and move to a different room. Do a quiet activity until you feel sleepy, then return to bed.   Avoid trying to sleep, go to bed only when you feel like you can fall asleep.   Avoid clock watching - turn your clock around so that you can't see it.  Always get up at the same time every day-maintain a regular sleep schedule.   Avoid late night exercise, but try to get regular exercise during the day   Avoid tobacco   Avoid alcohol-Alcohol fragments your sleep.  Avoid caffeine-Caffeine can prevent good sleep for up to 12 hours after ingested  Avoid falling asleep with the TV or radio on. Keep bedroom environment, quiet, dark, and at a comfortable temperature.  Avoid large meals before bedtime.  Limit liquids in the evening.  Avoid Daytime Napping      Other strategies:  Tell yourself that you are going to fall a sleep, Sleep all night and wake rested in the morning, Tell this to yourself at least 8 times before going to bed.   Do your deep breathing Breath in to the count of 3, hold your breath to the count of 3 and out to the count of 3. Repeat this 2-3 times.    If you are still not sleeping then tighten the muscles from the toes to the head. Hold and then release in the reverse order.  If you wake during the night repeat this.     Resources:   Sleep meditation apps such as Calm, Headspace  Book: Say Sakshi to Insomnia  Visit helpguide.org for insomnia tips  http://www.cbtforinsomnia.com/      Preventive Health Recommendations  Male Ages 40 to 49    Yearly exam:             See your  health care provider every year in order to  o   Review health changes.   o   Discuss preventive care.    o   Review your medicines if your doctor has prescribed any.  You should be tested each year for STDs (sexually transmitted diseases) if you re at risk.   Have a cholesterol test every 5 years.   Have a colonoscopy (test for colon cancer) if someone in your family has had colon cancer or polyps before age 50.   After age 45, have a diabetes test (fasting glucose). If you are at risk for diabetes, you should have this test every 3 years.    Talk with your health care provider about whether or not a prostate cancer screening test (PSA) is right for you.    Shots: Get a flu shot each year. Get a tetanus shot every 10 years.     Nutrition:  Eat at least 5 servings of fruits and vegetables daily.   Eat whole-grain bread, whole-wheat pasta and brown rice instead of white grains and rice.   Get adequate Calcium and Vitamin D.     Lifestyle  Exercise for at least 150 minutes a week (30 minutes a day, 5 days a week). This will help you control your weight and prevent disease.   Limit alcohol to one drink per day.   No smoking.   Wear sunscreen to prevent skin cancer.   See your dentist every six months for an exam and cleaning.

## 2023-08-18 NOTE — PROGRESS NOTES
SUBJECTIVE:   CC: Eduardo is an 42 year old who presents for preventative health visit.       8/21/2023     9:25 AM   Additional Questions   Roomed by Sydni       Healthy Habits:     Getting at least 3 servings of Calcium per day:  Yes    Bi-annual eye exam:  NO    Dental care twice a year:  Yes    Sleep apnea or symptoms of sleep apnea:  None    Diet:  Regular (no restrictions)    Frequency of exercise:  2-3 days/week    Duration of exercise:  30-45 minutes    Taking medications regularly:  Not Applicable    Medication side effects:  None    Additional concerns today:  No    Sometimes wake up in middle of night and can t get back to sleep. Otherwise, no   other concerns. Has been taking melatonin which has been somewhat helpful.    Last year used: OTC sleep (L theanine, herbal, melatonin) - this worked really well for him, but he read long term use hurt the kidneys so he stopped. Ideally he doesn't want to use anything long term  Some nights the plain melatonin doesn't work as well.  No symptoms of sleep apnea  He will wake up sometimes and then his mind runs and trouble falling back to sleep.    Migraine history: resolved    For a while has bilateral wrist pain, no paresthesias. He sleeps with wrists bent and this bothers him. Hurts with certain motions.  Found braces online but not that comfortable.    PHQ2 = 0    Social History     Tobacco Use    Smoking status: Never    Smokeless tobacco: Never   Substance Use Topics    Alcohol use: Yes     Alcohol/week: 0.0 standard drinks of alcohol     Comment: Occasionally             8/21/2023     9:10 AM   Alcohol Use   Prescreen: >3 drinks/day or >7 drinks/week? No          No data to display                Last PSA: No results found for: PSA    Reviewed orders with patient. Reviewed health maintenance and updated orders accordingly - Yes  Lab work is in process  Labs reviewed in EPIC  BP Readings from Last 3 Encounters:   08/21/23 128/80   05/25/22 120/72   06/20/21  131/88    Wt Readings from Last 3 Encounters:   08/21/23 86.7 kg (191 lb 3.2 oz)   05/25/22 88.3 kg (194 lb 11.2 oz)   07/01/19 80.7 kg (178 lb)                  Patient Active Problem List   Diagnosis    Migraine with aura and without status migrainosus, not intractable     Past Surgical History:   Procedure Laterality Date    wisdom teeth         Social History     Tobacco Use    Smoking status: Never    Smokeless tobacco: Never   Substance Use Topics    Alcohol use: Yes     Alcohol/week: 0.0 standard drinks of alcohol     Comment: Occasionally     Family History   Problem Relation Age of Onset    Hypertension Mother     Diabetes Father     Coronary Artery Disease Maternal Grandmother     Cancer No family hx of            Reviewed and updated as needed this visit by clinical staff   Tobacco  Allergies  Meds  Problems  Med Hx  Surg Hx  Fam Hx  Soc   Hx        Reviewed and updated as needed this visit by Provider   Tobacco  Allergies  Meds  Problems  Med Hx  Surg Hx  Fam Hx         Past Medical History:   Diagnosis Date    NO ACTIVE PROBLEMS       Past Surgical History:   Procedure Laterality Date    wisdom teeth         Review of Systems   Constitutional:  Negative for chills and fever.   HENT:  Negative for congestion, ear pain, hearing loss and sore throat.    Eyes:  Negative for pain and visual disturbance.   Respiratory:  Negative for cough and shortness of breath.    Cardiovascular:  Negative for chest pain, palpitations and peripheral edema.   Gastrointestinal:  Negative for abdominal pain, constipation, diarrhea, heartburn, hematochezia and nausea.   Genitourinary:  Negative for dysuria, frequency, genital sores, hematuria, impotence, penile discharge and urgency.   Musculoskeletal:  Negative for arthralgias, joint swelling and myalgias.   Skin:  Negative for rash.   Neurological:  Negative for dizziness, weakness, headaches and paresthesias.   Psychiatric/Behavioral:  Negative for mood  "changes. The patient is not nervous/anxious.      OBJECTIVE:   /80   Pulse 59   Temp 97.5  F (36.4  C) (Tympanic)   Resp 16   Ht 2.03 m (6' 7.92\")   Wt 86.7 kg (191 lb 3.2 oz)   SpO2 100%   BMI 21.05 kg/m      Physical Exam  GENERAL: healthy, alert and no distress  EYES: Eyes grossly normal to inspection, PERRL and conjunctivae and sclerae normal  HENT: ear canals and TM's normal, nose and mouth without ulcers or lesions  NECK: no adenopathy, no asymmetry, masses, or scars and thyroid normal to palpation  RESP: lungs clear to auscultation - no rales, rhonchi or wheezes  CV: regular rate and rhythm, normal S1 S2, no S3 or S4, no murmur, click or rub, no peripheral edema and peripheral pulses strong  ABDOMEN: soft, nontender, no hepatosplenomegaly, no masses and bowel sounds normal  MS: no gross musculoskeletal defects noted, no edema  POSITIVE wrists full ROM. No tenderness to palpation.  Finkelstein, phalen, tinel test all negative   SKIN: no suspicious lesions or rashes  NEURO: Normal strength and tone, mentation intact and speech normal  BACK: no CVA tenderness, no paralumbar tenderness  PSYCH: mentation appears normal, affect normal/bright  LYMPH: no cervical, supraclavicular, axillary, or inguinal adenopathy    Diagnostic Test Results:  Labs reviewed in Epic    ASSESSMENT/PLAN:   ASSESSMENT/PLAN:      ICD-10-CM    1. Routine general medical examination at a health care facility  Z00.00       2. Insomnia, unspecified type  G47.00       3. Migraine with aura and without status migrainosus, not intractable  G43.109       4. Pain in both wrists  M25.531     M25.532         We discussed, deferred labwork today    Patient Instructions   Wrists: use comfortable braces at night. If needed, physical therapy for wrists would be a good next step    The following measures and instructions are designed to help you achieve good sleep:    Sleep Hygiene for people with trouble sleeping    Allow at least a 1-hour " period to unwind before bedtime.  Reduce time in bed - if you are awake in bed more than 20 minutes and think that you will not fall asleep shortly, get out of bed and move to a different room. Do a quiet activity until you feel sleepy, then return to bed.   Avoid trying to sleep, go to bed only when you feel like you can fall asleep.   Avoid clock watching - turn your clock around so that you can't see it.  Always get up at the same time every day-maintain a regular sleep schedule.   Avoid late night exercise, but try to get regular exercise during the day   Avoid tobacco   Avoid alcohol-Alcohol fragments your sleep.  Avoid caffeine-Caffeine can prevent good sleep for up to 12 hours after ingested  Avoid falling asleep with the TV or radio on. Keep bedroom environment, quiet, dark, and at a comfortable temperature.  Avoid large meals before bedtime.  Limit liquids in the evening.  Avoid Daytime Napping      Other strategies:  Tell yourself that you are going to fall a sleep, Sleep all night and wake rested in the morning, Tell this to yourself at least 8 times before going to bed.   Do your deep breathing Breath in to the count of 3, hold your breath to the count of 3 and out to the count of 3. Repeat this 2-3 times.    If you are still not sleeping then tighten the muscles from the toes to the head. Hold and then release in the reverse order.  If you wake during the night repeat this.     Resources:   Sleep meditation apps such as Calm, Headspace  Book: Say Sakshi to Insomnia  Visit helpguide.org for insomnia tips  http://www.cbtforinsomnia.com/      COUNSELING:   Reviewed preventive health counseling, as reflected in patient instructions       Regular exercise       Healthy diet/nutrition       Immunizations  Declined: Covid-19 and Hepatitis B             Colorectal cancer screening       Prostate cancer screening       The 10-year ASCVD risk score (Kandy FREEMAN, et al., 2019) is: 0.8%    Values used to calculate  the score:      Age: 42 years      Sex: Male      Is Non- : No      Diabetic: No      Tobacco smoker: No      Systolic Blood Pressure: 128 mmHg      Is BP treated: No      HDL Cholesterol: 47 mg/dL      Total Cholesterol: 164 mg/dL        He reports that he has never smoked. He has never used smokeless tobacco.            HAROON Mcfarland [facility]

## 2023-08-21 ENCOUNTER — OFFICE VISIT (OUTPATIENT)
Dept: FAMILY MEDICINE | Facility: CLINIC | Age: 43
End: 2023-08-21
Payer: COMMERCIAL

## 2023-08-21 VITALS
HEART RATE: 59 BPM | WEIGHT: 191.2 LBS | HEIGHT: 78 IN | BODY MASS INDEX: 22.12 KG/M2 | OXYGEN SATURATION: 100 % | DIASTOLIC BLOOD PRESSURE: 80 MMHG | SYSTOLIC BLOOD PRESSURE: 128 MMHG | TEMPERATURE: 97.5 F | RESPIRATION RATE: 16 BRPM

## 2023-08-21 DIAGNOSIS — M25.531 PAIN IN BOTH WRISTS: ICD-10-CM

## 2023-08-21 DIAGNOSIS — Z00.00 ROUTINE GENERAL MEDICAL EXAMINATION AT A HEALTH CARE FACILITY: Primary | ICD-10-CM

## 2023-08-21 DIAGNOSIS — G43.109 MIGRAINE WITH AURA AND WITHOUT STATUS MIGRAINOSUS, NOT INTRACTABLE: ICD-10-CM

## 2023-08-21 DIAGNOSIS — M25.532 PAIN IN BOTH WRISTS: ICD-10-CM

## 2023-08-21 DIAGNOSIS — G47.00 INSOMNIA, UNSPECIFIED TYPE: ICD-10-CM

## 2023-08-21 PROCEDURE — 99396 PREV VISIT EST AGE 40-64: CPT | Performed by: PHYSICIAN ASSISTANT

## 2023-08-21 RX ORDER — MELATONIN 3 MG
10 TABLET ORAL ONCE
COMMUNITY

## 2023-08-21 ASSESSMENT — ENCOUNTER SYMPTOMS
PALPITATIONS: 0
HEMATURIA: 0
DIARRHEA: 0
HEARTBURN: 0
CHILLS: 0
SORE THROAT: 0
HEADACHES: 0
NERVOUS/ANXIOUS: 0
DYSURIA: 0
MYALGIAS: 0
NAUSEA: 0
WEAKNESS: 0
ARTHRALGIAS: 0
DIZZINESS: 0
CONSTIPATION: 0
HEMATOCHEZIA: 0
ABDOMINAL PAIN: 0
JOINT SWELLING: 0
EYE PAIN: 0
FEVER: 0
COUGH: 0
SHORTNESS OF BREATH: 0
FREQUENCY: 0
PARESTHESIAS: 0

## 2024-01-29 NOTE — PATIENT INSTRUCTIONS
Preventive Health Recommendations  Male Ages 26 - 39    Yearly exam:             See your health care provider every year in order to  o   Review health changes.   o   Discuss preventive care.    o   Review your medicines if your doctor has prescribed any.    You should be tested each year for STDs (sexually transmitted diseases), if you re at risk.     After age 35, talk to your provider about cholesterol testing. If you are at risk for heart disease, have your cholesterol tested at least every 5 years.     If you are at risk for diabetes, you should have a diabetes test (fasting glucose).  Shots: Get a flu shot each year. Get a tetanus shot every 10 years.     Nutrition:    Eat at least 5 servings of fruits and vegetables daily.     Eat whole-grain bread, whole-wheat pasta and brown rice instead of white grains and rice.     Talk to your provider about Calcium and Vitamin D.     Lifestyle    Exercise for at least 150 minutes a week (30 minutes a day, 5 days a week). This will help you control your weight and prevent disease.     Limit alcohol to one drink per day.     No smoking.     Wear sunscreen to prevent skin cancer.     See your dentist every six months for an exam and cleaning.      Noted

## 2024-07-22 ENCOUNTER — PATIENT OUTREACH (OUTPATIENT)
Dept: CARE COORDINATION | Facility: CLINIC | Age: 44
End: 2024-07-22
Payer: COMMERCIAL

## 2024-09-27 ENCOUNTER — OFFICE VISIT (OUTPATIENT)
Dept: FAMILY MEDICINE | Facility: CLINIC | Age: 44
End: 2024-09-27
Payer: COMMERCIAL

## 2024-09-27 VITALS
OXYGEN SATURATION: 98 % | WEIGHT: 180.6 LBS | RESPIRATION RATE: 16 BRPM | BODY MASS INDEX: 20.9 KG/M2 | SYSTOLIC BLOOD PRESSURE: 124 MMHG | DIASTOLIC BLOOD PRESSURE: 70 MMHG | HEART RATE: 64 BPM | TEMPERATURE: 97.2 F | HEIGHT: 78 IN

## 2024-09-27 DIAGNOSIS — Z00.00 ROUTINE GENERAL MEDICAL EXAMINATION AT A HEALTH CARE FACILITY: Primary | ICD-10-CM

## 2024-09-27 DIAGNOSIS — Z13.220 SCREENING FOR LIPOID DISORDERS: ICD-10-CM

## 2024-09-27 DIAGNOSIS — Z13.1 ENCOUNTER FOR SCREENING EXAMINATION FOR IMPAIRED GLUCOSE REGULATION AND DIABETES MELLITUS: ICD-10-CM

## 2024-09-27 DIAGNOSIS — R91.8 PULMONARY NODULES: ICD-10-CM

## 2024-09-27 LAB
CHOLEST SERPL-MCNC: 158 MG/DL
EST. AVERAGE GLUCOSE BLD GHB EST-MCNC: 111 MG/DL
FASTING STATUS PATIENT QL REPORTED: NO
HBA1C MFR BLD: 5.5 % (ref 0–5.6)
HDLC SERPL-MCNC: 46 MG/DL
LDLC SERPL CALC-MCNC: 96 MG/DL
NONHDLC SERPL-MCNC: 112 MG/DL
TRIGL SERPL-MCNC: 81 MG/DL

## 2024-09-27 PROCEDURE — 83036 HEMOGLOBIN GLYCOSYLATED A1C: CPT | Performed by: PHYSICIAN ASSISTANT

## 2024-09-27 PROCEDURE — 99396 PREV VISIT EST AGE 40-64: CPT | Performed by: PHYSICIAN ASSISTANT

## 2024-09-27 PROCEDURE — 80061 LIPID PANEL: CPT | Performed by: PHYSICIAN ASSISTANT

## 2024-09-27 PROCEDURE — 36415 COLL VENOUS BLD VENIPUNCTURE: CPT | Performed by: PHYSICIAN ASSISTANT

## 2024-09-27 NOTE — PROGRESS NOTES
Preventive Care Visit  [facility] TOYA Weldon PA-C, Family Medicine  Sep 27, 2024      Assessment & Plan     Routine general medical examination at a health care facility  Discussed lifestyle and prevention.   Discussed weight, he averages 190 lb. He has found in the summers he is more active and weighs less. Had a stomach bug this summer and will lose weight easily with this. He denies any chronic symptoms or illness concerns, appetite issues. Has balanced diet, eats plenty of protein. Will monitor and follow up if any continued weight loss.    Pulmonary nodules  He had Covid last year and x-ray at [facility] showed COPD. He saw pulmonology in [place] with normal PFTs and no COPD on CT. Pulmonologist did think that hyperinflation of lungs was due to body habitus. Did incidentally note a pulmonary nodule and recommended repeat CT in 1-2 years.    Screening for lipoid disorders  - Lipid panel reflex to direct LDL Non-fasting; Future  - Lipid panel reflex to direct LDL Non-fasting    Encounter for screening examination for impaired glucose regulation and diabetes mellitus  - Hemoglobin A1c; Future  - Hemoglobin A1c      Counseling  Appropriate preventive services were addressed with this patient via screening, questionnaire, or discussion as appropriate for fall prevention, nutrition, physical activity, Tobacco-use cessation, social engagement, weight loss and cognition.  Checklist reviewing preventive services available has been given to the patient.  Reviewed patient's diet, addressing concerns and/or questions.   He is at risk for lack of exercise and has been provided with information to increase physical activity for the benefit of his well-being.   He is at risk for psychosocial distress and has been provided with information to reduce risk.     Charli Clark is a 44 year old, presenting for the following:  Physical        9/27/2024     8:15 AM   Additional Questions   Roomed by  MAURICE Adam   Accompanied by Self         Health Care Directive  Patient does not have a Health Care Directive or Living Will: Patient states has Advance Directive and will bring in a copy to clinic.    HPI    No additional concerns               9/27/2024   General Health   How would you rate your overall physical health? Excellent   Feel stress (tense, anxious, or unable to sleep) Only a little      (!) STRESS CONCERN      9/27/2024   Nutrition   Three or more servings of calcium each day? Yes   Diet: Regular (no restrictions)   How many servings of fruit and vegetables per day? (!) 2-3   How many sweetened beverages each day? 0-1            9/27/2024   Exercise   Days per week of moderate/strenous exercise 3 days            9/27/2024   Social Factors   Frequency of gathering with friends or relatives Once a week   Worry food won't last until get money to buy more No   Food not last or not have enough money for food? No   Do you have housing? (Housing is defined as stable permanent housing and does not include staying ouside in a car, in a tent, in an abandoned building, in an overnight shelter, or couch-surfing.) Yes   Are you worried about losing your housing? No   Lack of transportation? No   Unable to get utilities (heat,electricity)? No            9/27/2024   Dental   Dentist two times every year? Yes            9/27/2024   TB Screening   Were you born outside of the [place]? No      Today's PHQ-2 Score:       9/27/2024     9:51 AM   PHQ-2 ( 1999 [employer])   Q1: Little interest or pleasure in doing things 0   Q2: Feeling down, depressed or hopeless 0   PHQ-2 Score 0   Q1: Little interest or pleasure in doing things Not at all   Q2: Feeling down, depressed or hopeless Not at all   PHQ-2 Score 0           9/27/2024   Substance Use   Alcohol more than 3/day or more than 7/wk Not Applicable   Do you use any other substances recreationally? No        Social History     Tobacco Use    Smoking status: Never    Smokeless  tobacco: Never   Vaping Use    Vaping status: Never Used   Substance Use Topics    Alcohol use: Yes     Alcohol/week: 0.0 standard drinks of alcohol     Comment: Occasionally    Drug use: No           9/27/2024   STI Screening   New sexual partner(s) since last STI/HIV test? No      ASCVD Risk   The ASCVD Risk score (Kandy FREEMAN, et al., 2019) failed to calculate for the following reasons:    The systolic blood pressure is missing        9/27/2024   Contraception/Family Planning   Questions about contraception or family planning No           Reviewed and updated as needed this visit by Provider                    Past Medical History:   Diagnosis Date    Migraine with aura and without status migrainosus, not intractable 05/25/2022    resolved on own. worst in college years, early 30s     Past Surgical History:   Procedure Laterality Date    wisdom teeth       Lab work is in process  Labs reviewed in EPIC  BP Readings from Last 3 Encounters:   09/27/24 124/70   08/21/23 128/80   05/25/22 120/72    Wt Readings from Last 3 Encounters:   09/27/24 81.9 kg (180 lb 9.6 oz)   08/21/23 86.7 kg (191 lb 3.2 oz)   05/25/22 88.3 kg (194 lb 11.2 oz)                  Patient Active Problem List   Diagnosis   (none) - all problems resolved or deleted     Past Surgical History:   Procedure Laterality Date    wisdom teeth         Social History     Tobacco Use    Smoking status: Never    Smokeless tobacco: Never   Substance Use Topics    Alcohol use: Yes     Alcohol/week: 0.0 standard drinks of alcohol     Comment: Occasionally     Family History   Problem Relation Age of Onset    Hypertension Mother     Cancer Mother         female specific, unknown    Diabetes Father     Coronary Artery Disease Maternal Grandmother     Prostate Cancer No family hx of     Colon Cancer No family hx of              Review of Systems  CONSTITUTIONAL: NEGATIVE for fever, chills, change in weight  INTEGUMENTARY/SKIN: NEGATIVE for worrisome rashes,  "moles or lesions  EYES: NEGATIVE for vision changes or irritation  ENT/MOUTH: NEGATIVE for ear, mouth and throat problems  RESP: NEGATIVE for significant cough or SOB  BREAST: NEGATIVE for masses, tenderness or discharge  CV: NEGATIVE for chest pain, palpitations or peripheral edema  GI: NEGATIVE for nausea, abdominal pain, heartburn, or change in bowel habits  : NEGATIVE for frequency, dysuria, or hematuria  MUSCULOSKELETAL: NEGATIVE for significant arthralgias or myalgia  NEURO: NEGATIVE for weakness, dizziness or paresthesias  ENDOCRINE: NEGATIVE for temperature intolerance, skin/hair changes  HEME: NEGATIVE for bleeding problems  PSYCHIATRIC: NEGATIVE for changes in mood or affect     Objective    Exam  There were no vitals taken for this visit.   Estimated body mass index is 21.05 kg/m  as calculated from the following:    Height as of 8/21/23: 2.03 m (6' 7.92\").    Weight as of 8/21/23: 86.7 kg (191 lb 3.2 oz).    Physical Exam  GENERAL: alert and no distress  EYES: Eyes grossly normal to inspection, PERRL and conjunctivae and sclerae normal  HENT: ear canals and TM's normal, nose and mouth without ulcers or lesions  NECK: no adenopathy, no asymmetry, masses, or scars  RESP: lungs clear to auscultation - no rales, rhonchi or wheezes  CV: regular rate and rhythm, normal S1 S2, no S3 or S4, no murmur, click or rub, no peripheral edema  ABDOMEN: soft, nontender, no hepatosplenomegaly, no masses and bowel sounds normal  MS: no gross musculoskeletal defects noted, no edema  SKIN: no suspicious lesions or rashes  NEURO: Normal strength and tone, mentation intact and speech normal  PSYCH: mentation appears normal, affect normal/bright        Signed Electronically by: Shanita Weldon PA-C    "

## 2024-09-27 NOTE — PATIENT INSTRUCTIONS
Patient Education   Preventive Care Advice   This is general advice given by our system to help you stay healthy. However, your care team may have specific advice just for you. Please talk to your care team about your preventive care needs.  Nutrition  Eat 5 or more servings of fruits and vegetables each day.  Try wheat bread, brown rice and whole grain pasta (instead of white bread, rice, and pasta).  Get enough calcium and vitamin D. Check the label on foods and aim for 100% of the RDA (recommended daily allowance).  Lifestyle  Exercise at least 150 minutes each week  (30 minutes a day, 5 days a week).  Do muscle strengthening activities 2 days a week. These help control your weight and prevent disease.  No smoking.  Wear sunscreen to prevent skin cancer.  Have a dental exam and cleaning every 6 months.  Yearly exams  See your health care team every year to talk about:  Any changes in your health.  Any medicines your care team has prescribed.  Preventive care, family planning, and ways to prevent chronic diseases.  Shots (vaccines)   HPV shots (up to age 26), if you've never had them before.  Hepatitis B shots (up to age 59), if you've never had them before.  COVID-19 shot: Get this shot when it's due.  Flu shot: Get a flu shot every year.  Tetanus shot: Get a tetanus shot every 10 years.  Pneumococcal, hepatitis A, and RSV shots: Ask your care team if you need these based on your risk.  Shingles shot (for age 50 and up)  General health tests  Diabetes screening:  Starting at age 35, Get screened for diabetes at least every 3 years.  If you are younger than age 35, ask your care team if you should be screened for diabetes.  Cholesterol test: At age 39, start having a cholesterol test every 5 years, or more often if advised.  Bone density scan (DEXA): At age 50, ask your care team if you should have this scan for osteoporosis (brittle bones).  Hepatitis C: Get tested at least once in your life.  STIs (sexually  transmitted infections)  Before age 24: Ask your care team if you should be screened for STIs.  After age 24: Get screened for STIs if you're at risk. You are at risk for STIs (including HIV) if:  You are sexually active with more than one person.  You don't use condoms every time.  You or a partner was diagnosed with a sexually transmitted infection.  If you are at risk for HIV, ask about PrEP medicine to prevent HIV.  Get tested for HIV at least once in your life, whether you are at risk for HIV or not.  Cancer screening tests  Cervical cancer screening: If you have a cervix, begin getting regular cervical cancer screening tests starting at age 21.  Breast cancer scan (mammogram): If you've ever had breasts, begin having regular mammograms starting at age 40. This is a scan to check for breast cancer.  Colon cancer screening: It is important to start screening for colon cancer at age 45.  Have a colonoscopy test every 10 years (or more often if you're at risk) Or, ask your provider about stool tests like a FIT test every year or Cologuard test every 3 years.  To learn more about your testing options, visit:   .  For help making a decision, visit:   https://bit.ly/hk46557.  Prostate cancer screening test: If you have a prostate, ask your care team if a prostate cancer screening test (PSA) at age 55 is right for you.  Lung cancer screening: If you are a current or former smoker ages 50 to 80, ask your care team if ongoing lung cancer screenings are right for you.  For informational purposes only. Not to replace the advice of your health care provider. Copyright   2023 [employer]. All rights reserved. Clinically reviewed by the [facility]. [employer] [id number] - REV 01/24.

## 2025-08-28 ENCOUNTER — PATIENT OUTREACH (OUTPATIENT)
Dept: CARE COORDINATION | Facility: CLINIC | Age: 45
End: 2025-08-28
Payer: COMMERCIAL